# Patient Record
Sex: FEMALE | Race: WHITE | NOT HISPANIC OR LATINO
[De-identification: names, ages, dates, MRNs, and addresses within clinical notes are randomized per-mention and may not be internally consistent; named-entity substitution may affect disease eponyms.]

---

## 2017-10-25 PROBLEM — Z00.00 ENCOUNTER FOR PREVENTIVE HEALTH EXAMINATION: Status: ACTIVE | Noted: 2017-10-25

## 2017-11-16 ENCOUNTER — APPOINTMENT (OUTPATIENT)
Dept: CARDIOLOGY | Facility: CLINIC | Age: 82
End: 2017-11-16

## 2018-06-01 ENCOUNTER — RECORD ABSTRACTING (OUTPATIENT)
Age: 83
End: 2018-06-01

## 2018-07-18 DIAGNOSIS — Z78.9 OTHER SPECIFIED HEALTH STATUS: ICD-10-CM

## 2018-07-18 DIAGNOSIS — Z87.898 PERSONAL HISTORY OF OTHER SPECIFIED CONDITIONS: ICD-10-CM

## 2018-07-18 DIAGNOSIS — Z87.39 PERSONAL HISTORY OF OTHER DISEASES OF THE MUSCULOSKELETAL SYSTEM AND CONNECTIVE TISSUE: ICD-10-CM

## 2018-07-18 DIAGNOSIS — Z86.69 PERSONAL HISTORY OF OTHER DISEASES OF THE NERVOUS SYSTEM AND SENSE ORGANS: ICD-10-CM

## 2018-07-18 DIAGNOSIS — R60.0 LOCALIZED EDEMA: ICD-10-CM

## 2018-07-18 DIAGNOSIS — Z87.891 PERSONAL HISTORY OF NICOTINE DEPENDENCE: ICD-10-CM

## 2018-07-18 DIAGNOSIS — I47.1 SUPRAVENTRICULAR TACHYCARDIA: ICD-10-CM

## 2018-07-18 DIAGNOSIS — Z86.79 PERSONAL HISTORY OF OTHER DISEASES OF THE CIRCULATORY SYSTEM: ICD-10-CM

## 2018-08-01 ENCOUNTER — APPOINTMENT (OUTPATIENT)
Dept: CARDIOLOGY | Facility: CLINIC | Age: 83
End: 2018-08-01

## 2018-08-01 ENCOUNTER — RECORD ABSTRACTING (OUTPATIENT)
Age: 83
End: 2018-08-01

## 2018-08-11 PROCEDURE — 99285 EMERGENCY DEPT VISIT HI MDM: CPT

## 2018-08-11 PROCEDURE — 74177 CT ABD & PELVIS W/CONTRAST: CPT | Mod: 26

## 2018-08-12 ENCOUNTER — INPATIENT (INPATIENT)
Facility: HOSPITAL | Age: 83
LOS: 3 days | Discharge: HOME | End: 2018-08-16
Payer: MEDICARE

## 2018-08-13 PROCEDURE — 74019 RADEX ABDOMEN 2 VIEWS: CPT | Mod: 26

## 2018-11-08 ENCOUNTER — APPOINTMENT (OUTPATIENT)
Dept: CARDIOLOGY | Facility: CLINIC | Age: 83
End: 2018-11-08

## 2018-11-15 ENCOUNTER — APPOINTMENT (OUTPATIENT)
Dept: CARDIOLOGY | Facility: CLINIC | Age: 83
End: 2018-11-15
Payer: MEDICARE

## 2018-11-15 ENCOUNTER — NON-APPOINTMENT (OUTPATIENT)
Age: 83
End: 2018-11-15

## 2018-11-15 VITALS
BODY MASS INDEX: 23.82 KG/M2 | DIASTOLIC BLOOD PRESSURE: 68 MMHG | HEIGHT: 65 IN | WEIGHT: 143 LBS | OXYGEN SATURATION: 97 % | SYSTOLIC BLOOD PRESSURE: 116 MMHG | HEART RATE: 50 BPM

## 2018-11-15 DIAGNOSIS — Z86.69 PERSONAL HISTORY OF OTHER DISEASES OF THE NERVOUS SYSTEM AND SENSE ORGANS: ICD-10-CM

## 2018-11-15 DIAGNOSIS — Z87.19 PERSONAL HISTORY OF OTHER DISEASES OF THE DIGESTIVE SYSTEM: ICD-10-CM

## 2018-11-15 DIAGNOSIS — Z86.59 PERSONAL HISTORY OF OTHER MENTAL AND BEHAVIORAL DISORDERS: ICD-10-CM

## 2018-11-15 DIAGNOSIS — Z86.79 PERSONAL HISTORY OF OTHER DISEASES OF THE CIRCULATORY SYSTEM: ICD-10-CM

## 2018-11-15 DIAGNOSIS — Z86.39 PERSONAL HISTORY OF OTHER ENDOCRINE, NUTRITIONAL AND METABOLIC DISEASE: ICD-10-CM

## 2018-11-15 DIAGNOSIS — G89.4 CHRONIC PAIN SYNDROME: ICD-10-CM

## 2018-11-15 DIAGNOSIS — I10 ESSENTIAL (PRIMARY) HYPERTENSION: ICD-10-CM

## 2018-11-15 DIAGNOSIS — Z87.898 PERSONAL HISTORY OF OTHER SPECIFIED CONDITIONS: ICD-10-CM

## 2018-11-15 PROCEDURE — 93000 ELECTROCARDIOGRAM COMPLETE: CPT

## 2018-11-15 PROCEDURE — 99204 OFFICE O/P NEW MOD 45 MIN: CPT

## 2018-11-15 RX ORDER — ALPRAZOLAM 0.25 MG/1
0.25 TABLET ORAL TWICE DAILY
Refills: 0 | Status: ACTIVE | COMMUNITY

## 2018-11-15 RX ORDER — LEVOTHYROXINE SODIUM 88 UG/1
88 TABLET ORAL DAILY
Refills: 0 | Status: ACTIVE | COMMUNITY

## 2018-11-15 RX ORDER — ATENOLOL 25 MG/1
25 TABLET ORAL
Refills: 0 | Status: ACTIVE | COMMUNITY

## 2018-11-15 RX ORDER — ERGOCALCIFEROL 1.25 MG/1
1.25 MG CAPSULE, LIQUID FILLED ORAL
Refills: 0 | Status: ACTIVE | COMMUNITY

## 2018-11-15 RX ORDER — ESCITALOPRAM OXALATE 20 MG/1
20 TABLET ORAL DAILY
Refills: 0 | Status: ACTIVE | COMMUNITY

## 2018-11-15 RX ORDER — GABAPENTIN 100 MG/1
100 CAPSULE ORAL 3 TIMES DAILY
Refills: 0 | Status: ACTIVE | COMMUNITY

## 2018-11-15 RX ORDER — OXYCODONE 20 MG/1
20 TABLET ORAL TWICE DAILY
Refills: 0 | Status: ACTIVE | COMMUNITY

## 2018-11-15 RX ORDER — ZOLPIDEM TARTRATE 5 MG/1
5 TABLET, FILM COATED ORAL
Refills: 0 | Status: ACTIVE | COMMUNITY

## 2018-12-04 ENCOUNTER — OUTPATIENT (OUTPATIENT)
Dept: OUTPATIENT SERVICES | Facility: HOSPITAL | Age: 83
LOS: 1 days | End: 2018-12-04
Payer: MEDICARE

## 2018-12-04 PROCEDURE — 71046 X-RAY EXAM CHEST 2 VIEWS: CPT | Mod: 26

## 2018-12-06 ENCOUNTER — OUTPATIENT (OUTPATIENT)
Dept: OUTPATIENT SERVICES | Facility: HOSPITAL | Age: 83
LOS: 1 days | End: 2018-12-06

## 2018-12-06 ENCOUNTER — EMERGENCY (EMERGENCY)
Facility: HOSPITAL | Age: 83
LOS: 1 days | End: 2018-12-06
Payer: MEDICARE

## 2018-12-06 PROCEDURE — 74175 CTA ABDOMEN W/CONTRAST: CPT | Mod: 26

## 2018-12-06 PROCEDURE — 99285 EMERGENCY DEPT VISIT HI MDM: CPT

## 2018-12-06 PROCEDURE — 99223 1ST HOSP IP/OBS HIGH 75: CPT

## 2018-12-06 PROCEDURE — 71275 CT ANGIOGRAPHY CHEST: CPT | Mod: 26

## 2018-12-06 PROCEDURE — 71046 X-RAY EXAM CHEST 2 VIEWS: CPT | Mod: 26

## 2018-12-07 PROCEDURE — 93306 TTE W/DOPPLER COMPLETE: CPT | Mod: 26

## 2018-12-07 PROCEDURE — 99232 SBSQ HOSP IP/OBS MODERATE 35: CPT

## 2018-12-10 NOTE — HISTORY OF PRESENT ILLNESS
[FreeTextEntry1] : The patient is presenting here today for cardiac evaluation. Patient has a history of hypertension. Patient has a history of abdominal aortic aneurysm. She follows with vascular surgery.The patient has a history of sinus bradycardia. She reports no symptoms of any dizziness lightheadedness. No syncope or near syncope. No chest pains or shortness of breath. Patient is legally blind. She walks on a daily basis without any symptoms. She is physically very active.

## 2018-12-10 NOTE — ASSESSMENT
[FreeTextEntry1] : Hypertension well controlled.\par Today's ECG was done. Sinus bradycardia. No change compared to previous BCG. Patient is asymptomatic.\par Patient is physically very active. No chest pains or shortness of breath. She had a stress echocardiogram done a few years ago. No evidence of any ischemia.\par Abdominal aortic aneurysm. Patient follows with vascular surgery.\par Cardiac followup she'll be on an as needed.\par \par Addendum December 10, 2018. Patient is scheduled for endovascular repair of abdominal aneurysm. Based on examination from November 15, 2018 she is well compensated, in no significant risk for cardiovascular events during scheduled procedure.

## 2018-12-13 ENCOUNTER — INPATIENT (INPATIENT)
Facility: HOSPITAL | Age: 83
LOS: 0 days | Discharge: HOME | End: 2018-12-14

## 2019-04-19 ENCOUNTER — OUTPATIENT (OUTPATIENT)
Dept: OUTPATIENT SERVICES | Facility: HOSPITAL | Age: 84
LOS: 1 days | End: 2019-04-19

## 2019-11-24 ENCOUNTER — TRANSCRIPTION ENCOUNTER (OUTPATIENT)
Age: 84
End: 2019-11-24

## 2019-11-24 ENCOUNTER — EMERGENCY (EMERGENCY)
Facility: HOSPITAL | Age: 84
LOS: 1 days | End: 2019-11-24
Admitting: EMERGENCY MEDICINE
Payer: MEDICARE

## 2019-11-24 PROCEDURE — 71275 CT ANGIOGRAPHY CHEST: CPT | Mod: 26

## 2019-11-24 PROCEDURE — 71045 X-RAY EXAM CHEST 1 VIEW: CPT | Mod: 26

## 2019-11-24 PROCEDURE — 99285 EMERGENCY DEPT VISIT HI MDM: CPT | Mod: 25

## 2019-11-24 PROCEDURE — 71045 X-RAY EXAM CHEST 1 VIEW: CPT | Mod: 26,77

## 2019-11-24 PROCEDURE — 74175 CTA ABDOMEN W/CONTRAST: CPT | Mod: 26

## 2019-11-24 PROCEDURE — 36556 INSERT NON-TUNNEL CV CATH: CPT

## 2019-11-24 PROCEDURE — 74019 RADEX ABDOMEN 2 VIEWS: CPT | Mod: 26

## 2019-11-25 ENCOUNTER — INPATIENT (INPATIENT)
Facility: HOSPITAL | Age: 84
LOS: 3 days | Discharge: HOME | DRG: 445 | End: 2019-11-29
Attending: HOSPITALIST | Admitting: HOSPITALIST
Payer: MEDICARE

## 2019-11-25 VITALS
TEMPERATURE: 99 F | RESPIRATION RATE: 18 BRPM | OXYGEN SATURATION: 96 % | DIASTOLIC BLOOD PRESSURE: 81 MMHG | SYSTOLIC BLOOD PRESSURE: 125 MMHG | WEIGHT: 119.93 LBS | HEIGHT: 63 IN | HEART RATE: 87 BPM

## 2019-11-25 DIAGNOSIS — Z98.49 CATARACT EXTRACTION STATUS, UNSPECIFIED EYE: Chronic | ICD-10-CM

## 2019-11-25 DIAGNOSIS — K80.50 CALCULUS OF BILE DUCT WITHOUT CHOLANGITIS OR CHOLECYSTITIS WITHOUT OBSTRUCTION: ICD-10-CM

## 2019-11-25 DIAGNOSIS — Z90.49 ACQUIRED ABSENCE OF OTHER SPECIFIED PARTS OF DIGESTIVE TRACT: Chronic | ICD-10-CM

## 2019-11-25 DIAGNOSIS — Z93.3 COLOSTOMY STATUS: Chronic | ICD-10-CM

## 2019-11-25 DIAGNOSIS — H26.9 UNSPECIFIED CATARACT: Chronic | ICD-10-CM

## 2019-11-25 LAB
ABO RH CONFIRMATION: SIGNIFICANT CHANGE UP
ALBUMIN SERPL ELPH-MCNC: 3.2 G/DL — LOW (ref 3.3–5.2)
ALP SERPL-CCNC: 162 U/L — HIGH (ref 40–120)
ALT FLD-CCNC: 233 U/L — HIGH
ANION GAP SERPL CALC-SCNC: 14 MMOL/L — SIGNIFICANT CHANGE UP (ref 5–17)
ANION GAP SERPL CALC-SCNC: 15 MMOL/L — SIGNIFICANT CHANGE UP (ref 5–17)
APTT BLD: 29 SEC — SIGNIFICANT CHANGE UP (ref 27.5–36.3)
AST SERPL-CCNC: 527 U/L — HIGH
BASOPHILS # BLD AUTO: 0 K/UL — SIGNIFICANT CHANGE UP (ref 0–0.2)
BASOPHILS NFR BLD AUTO: 0 % — SIGNIFICANT CHANGE UP (ref 0–2)
BILIRUB SERPL-MCNC: 3.3 MG/DL — HIGH (ref 0.4–2)
BLD GP AB SCN SERPL QL: SIGNIFICANT CHANGE UP
BUN SERPL-MCNC: 12 MG/DL — SIGNIFICANT CHANGE UP (ref 8–20)
BUN SERPL-MCNC: 13 MG/DL — SIGNIFICANT CHANGE UP (ref 8–20)
BURR CELLS BLD QL SMEAR: PRESENT — SIGNIFICANT CHANGE UP
CALCIUM SERPL-MCNC: 8.5 MG/DL — LOW (ref 8.6–10.2)
CALCIUM SERPL-MCNC: 8.6 MG/DL — SIGNIFICANT CHANGE UP (ref 8.6–10.2)
CHLORIDE SERPL-SCNC: 104 MMOL/L — SIGNIFICANT CHANGE UP (ref 98–107)
CHLORIDE SERPL-SCNC: 104 MMOL/L — SIGNIFICANT CHANGE UP (ref 98–107)
CO2 SERPL-SCNC: 21 MMOL/L — LOW (ref 22–29)
CO2 SERPL-SCNC: 24 MMOL/L — SIGNIFICANT CHANGE UP (ref 22–29)
CREAT SERPL-MCNC: 0.45 MG/DL — LOW (ref 0.5–1.3)
CREAT SERPL-MCNC: 0.47 MG/DL — LOW (ref 0.5–1.3)
ELLIPTOCYTES BLD QL SMEAR: SIGNIFICANT CHANGE UP
EOSINOPHIL # BLD AUTO: 0 K/UL — SIGNIFICANT CHANGE UP (ref 0–0.5)
EOSINOPHIL NFR BLD AUTO: 0 % — SIGNIFICANT CHANGE UP (ref 0–6)
GIANT PLATELETS BLD QL SMEAR: PRESENT — SIGNIFICANT CHANGE UP
GLUCOSE SERPL-MCNC: 101 MG/DL — SIGNIFICANT CHANGE UP (ref 70–115)
GLUCOSE SERPL-MCNC: 127 MG/DL — HIGH (ref 70–115)
HCT VFR BLD CALC: 41 % — SIGNIFICANT CHANGE UP (ref 34.5–45)
HGB BLD-MCNC: 12.5 G/DL — SIGNIFICANT CHANGE UP (ref 11.5–15.5)
INR BLD: 1.11 RATIO — SIGNIFICANT CHANGE UP (ref 0.88–1.16)
LACTATE BLDV-MCNC: 1.8 MMOL/L — SIGNIFICANT CHANGE UP (ref 0.5–2)
LYMPHOCYTES # BLD AUTO: 0.42 K/UL — LOW (ref 1–3.3)
LYMPHOCYTES # BLD AUTO: 6.2 % — LOW (ref 13–44)
MANUAL SMEAR VERIFICATION: SIGNIFICANT CHANGE UP
MCHC RBC-ENTMCNC: 28.1 PG — SIGNIFICANT CHANGE UP (ref 27–34)
MCHC RBC-ENTMCNC: 30.5 GM/DL — LOW (ref 32–36)
MCV RBC AUTO: 92.1 FL — SIGNIFICANT CHANGE UP (ref 80–100)
METAMYELOCYTES # FLD: 1.8 % — HIGH (ref 0–0)
MONOCYTES # BLD AUTO: 0.06 K/UL — SIGNIFICANT CHANGE UP (ref 0–0.9)
MONOCYTES NFR BLD AUTO: 0.9 % — LOW (ref 2–14)
NEUTROPHILS # BLD AUTO: 6.21 K/UL — SIGNIFICANT CHANGE UP (ref 1.8–7.4)
NEUTROPHILS NFR BLD AUTO: 53.1 % — SIGNIFICANT CHANGE UP (ref 43–77)
NEUTS BAND # BLD: 38 % — HIGH (ref 0–8)
PLAT MORPH BLD: NORMAL — SIGNIFICANT CHANGE UP
PLATELET # BLD AUTO: 88 K/UL — LOW (ref 150–400)
POIKILOCYTOSIS BLD QL AUTO: SIGNIFICANT CHANGE UP
POTASSIUM SERPL-MCNC: 2.8 MMOL/L — CRITICAL LOW (ref 3.5–5.3)
POTASSIUM SERPL-MCNC: 4.2 MMOL/L — SIGNIFICANT CHANGE UP (ref 3.5–5.3)
POTASSIUM SERPL-SCNC: 2.8 MMOL/L — CRITICAL LOW (ref 3.5–5.3)
POTASSIUM SERPL-SCNC: 4.2 MMOL/L — SIGNIFICANT CHANGE UP (ref 3.5–5.3)
PROCALCITONIN SERPL-MCNC: 28.94 NG/ML — HIGH (ref 0.02–0.1)
PROT SERPL-MCNC: 5.6 G/DL — LOW (ref 6.6–8.7)
PROTHROM AB SERPL-ACNC: 12.8 SEC — SIGNIFICANT CHANGE UP (ref 10–12.9)
RBC # BLD: 4.45 M/UL — SIGNIFICANT CHANGE UP (ref 3.8–5.2)
RBC # FLD: 14.3 % — SIGNIFICANT CHANGE UP (ref 10.3–14.5)
RBC BLD AUTO: ABNORMAL
SODIUM SERPL-SCNC: 139 MMOL/L — SIGNIFICANT CHANGE UP (ref 135–145)
SODIUM SERPL-SCNC: 143 MMOL/L — SIGNIFICANT CHANGE UP (ref 135–145)
WBC # BLD: 6.82 K/UL — SIGNIFICANT CHANGE UP (ref 3.8–10.5)
WBC # FLD AUTO: 6.82 K/UL — SIGNIFICANT CHANGE UP (ref 3.8–10.5)

## 2019-11-25 PROCEDURE — 43274 ERCP DUCT STENT PLACEMENT: CPT

## 2019-11-25 PROCEDURE — 99223 1ST HOSP IP/OBS HIGH 75: CPT | Mod: 25

## 2019-11-25 PROCEDURE — 93010 ELECTROCARDIOGRAM REPORT: CPT

## 2019-11-25 PROCEDURE — 12345: CPT | Mod: NC

## 2019-11-25 PROCEDURE — 99285 EMERGENCY DEPT VISIT HI MDM: CPT

## 2019-11-25 PROCEDURE — 99223 1ST HOSP IP/OBS HIGH 75: CPT

## 2019-11-25 PROCEDURE — 43264 ERCP REMOVE DUCT CALCULI: CPT | Mod: 22

## 2019-11-25 RX ORDER — ALPRAZOLAM 0.25 MG
0.25 TABLET ORAL DAILY
Refills: 0 | Status: DISCONTINUED | OUTPATIENT
Start: 2019-11-25 | End: 2019-11-29

## 2019-11-25 RX ORDER — MORPHINE SULFATE 50 MG/1
2 CAPSULE, EXTENDED RELEASE ORAL EVERY 6 HOURS
Refills: 0 | Status: DISCONTINUED | OUTPATIENT
Start: 2019-11-25 | End: 2019-11-25

## 2019-11-25 RX ORDER — ESCITALOPRAM OXALATE 10 MG/1
20 TABLET, FILM COATED ORAL DAILY
Refills: 0 | Status: DISCONTINUED | OUTPATIENT
Start: 2019-11-25 | End: 2019-11-29

## 2019-11-25 RX ORDER — LEVOTHYROXINE SODIUM 125 MCG
88 TABLET ORAL DAILY
Refills: 0 | Status: DISCONTINUED | OUTPATIENT
Start: 2019-11-25 | End: 2019-11-29

## 2019-11-25 RX ORDER — ATENOLOL 25 MG/1
25 TABLET ORAL
Refills: 0 | Status: DISCONTINUED | OUTPATIENT
Start: 2019-11-25 | End: 2019-11-29

## 2019-11-25 RX ORDER — SODIUM CHLORIDE 9 MG/ML
1000 INJECTION INTRAMUSCULAR; INTRAVENOUS; SUBCUTANEOUS
Refills: 0 | Status: DISCONTINUED | OUTPATIENT
Start: 2019-11-25 | End: 2019-11-29

## 2019-11-25 RX ORDER — PIPERACILLIN AND TAZOBACTAM 4; .5 G/20ML; G/20ML
3.38 INJECTION, POWDER, LYOPHILIZED, FOR SOLUTION INTRAVENOUS ONCE
Refills: 0 | Status: COMPLETED | OUTPATIENT
Start: 2019-11-25 | End: 2019-11-25

## 2019-11-25 RX ORDER — ACETAMINOPHEN 500 MG
650 TABLET ORAL EVERY 6 HOURS
Refills: 0 | Status: DISCONTINUED | OUTPATIENT
Start: 2019-11-25 | End: 2019-11-29

## 2019-11-25 RX ORDER — POTASSIUM CHLORIDE 20 MEQ
10 PACKET (EA) ORAL
Refills: 0 | Status: COMPLETED | OUTPATIENT
Start: 2019-11-25 | End: 2019-11-25

## 2019-11-25 RX ORDER — POTASSIUM CHLORIDE 20 MEQ
40 PACKET (EA) ORAL ONCE
Refills: 0 | Status: COMPLETED | OUTPATIENT
Start: 2019-11-25 | End: 2019-11-25

## 2019-11-25 RX ORDER — MORPHINE SULFATE 50 MG/1
4 CAPSULE, EXTENDED RELEASE ORAL EVERY 6 HOURS
Refills: 0 | Status: DISCONTINUED | OUTPATIENT
Start: 2019-11-25 | End: 2019-11-29

## 2019-11-25 RX ORDER — MORPHINE SULFATE 50 MG/1
2 CAPSULE, EXTENDED RELEASE ORAL EVERY 6 HOURS
Refills: 0 | Status: DISCONTINUED | OUTPATIENT
Start: 2019-11-25 | End: 2019-11-29

## 2019-11-25 RX ORDER — ZOLPIDEM TARTRATE 10 MG/1
5 TABLET ORAL AT BEDTIME
Refills: 0 | Status: DISCONTINUED | OUTPATIENT
Start: 2019-11-25 | End: 2019-11-29

## 2019-11-25 RX ORDER — OXYCODONE HYDROCHLORIDE 5 MG/1
20 TABLET ORAL EVERY 12 HOURS
Refills: 0 | Status: DISCONTINUED | OUTPATIENT
Start: 2019-11-25 | End: 2019-11-29

## 2019-11-25 RX ORDER — PIPERACILLIN AND TAZOBACTAM 4; .5 G/20ML; G/20ML
3.38 INJECTION, POWDER, LYOPHILIZED, FOR SOLUTION INTRAVENOUS EVERY 8 HOURS
Refills: 0 | Status: DISCONTINUED | OUTPATIENT
Start: 2019-11-25 | End: 2019-11-27

## 2019-11-25 RX ADMIN — MORPHINE SULFATE 4 MILLIGRAM(S): 50 CAPSULE, EXTENDED RELEASE ORAL at 10:29

## 2019-11-25 RX ADMIN — Medication 100 MILLIEQUIVALENT(S): at 07:03

## 2019-11-25 RX ADMIN — OXYCODONE HYDROCHLORIDE 20 MILLIGRAM(S): 5 TABLET ORAL at 05:42

## 2019-11-25 RX ADMIN — Medication 100 MILLIEQUIVALENT(S): at 05:52

## 2019-11-25 RX ADMIN — Medication 88 MICROGRAM(S): at 05:51

## 2019-11-25 RX ADMIN — Medication 100 MILLIEQUIVALENT(S): at 05:25

## 2019-11-25 RX ADMIN — Medication 40 MILLIEQUIVALENT(S): at 05:26

## 2019-11-25 RX ADMIN — PIPERACILLIN AND TAZOBACTAM 25 GRAM(S): 4; .5 INJECTION, POWDER, LYOPHILIZED, FOR SOLUTION INTRAVENOUS at 18:34

## 2019-11-25 RX ADMIN — OXYCODONE HYDROCHLORIDE 20 MILLIGRAM(S): 5 TABLET ORAL at 18:34

## 2019-11-25 RX ADMIN — OXYCODONE HYDROCHLORIDE 20 MILLIGRAM(S): 5 TABLET ORAL at 08:35

## 2019-11-25 RX ADMIN — PIPERACILLIN AND TAZOBACTAM 200 GRAM(S): 4; .5 INJECTION, POWDER, LYOPHILIZED, FOR SOLUTION INTRAVENOUS at 07:02

## 2019-11-25 RX ADMIN — Medication 40 MILLIEQUIVALENT(S): at 09:43

## 2019-11-25 RX ADMIN — SODIUM CHLORIDE 75 MILLILITER(S): 9 INJECTION INTRAMUSCULAR; INTRAVENOUS; SUBCUTANEOUS at 07:04

## 2019-11-25 RX ADMIN — MORPHINE SULFATE 4 MILLIGRAM(S): 50 CAPSULE, EXTENDED RELEASE ORAL at 09:42

## 2019-11-25 RX ADMIN — Medication 0.25 MILLIGRAM(S): at 07:23

## 2019-11-25 RX ADMIN — MORPHINE SULFATE 2 MILLIGRAM(S): 50 CAPSULE, EXTENDED RELEASE ORAL at 04:49

## 2019-11-25 RX ADMIN — MORPHINE SULFATE 2 MILLIGRAM(S): 50 CAPSULE, EXTENDED RELEASE ORAL at 08:35

## 2019-11-25 NOTE — H&P ADULT - ASSESSMENT
84yoF hx HTN, AAA, depression/anxiety,  perforated viscus s/p sigmoid colectomy with ostomy (2014), legally blind from macular degeneration, chronic pain from diffuse OA and spinal stenosis, sent from [facility] ([facility] [place]) for common bile duct stones and need for ERCP    Choledocholithiasis   -Admit to medicine  -Received 2L at [facility], will start gentle maintenance IVF  -NPO except meds  -Checked procalcitonin, very elevated at 28, will order blood cultures and start Zosyn for possible superimposed hepatobiliary infection  -Pain control with morphine  -GI consulted    HTN  -Atenolol resumed, dosing every other day    Hypokalemia  -K+ now 2.8 on admission, ordered IV and PO repletion  -Monitored bed    AAA  -No acute dissection on CTA, showed stable 5cm ascending aorta aneurysm and 5.4cm infrarenal abdominal aneurysm   -Outpatient surveillance    Chronic pain syndrome  -From OA of neck, back and knees and spinal stenosis  -OxyContin 20mg ER resumed    Depression/anxiety  -Escitalopram and Xanax PRN resumed    Hypothyroidism  -Levothyroxine resumed    Prophylactic measure  -Lovenox 84yoF hx HTN, AAA, depression/anxiety,  perforated viscus s/p sigmoid colectomy with ostomy (2014), legally blind from macular degeneration, chronic pain from diffuse OA and spinal stenosis, sent from [facility] ([facility] [place]) for common bile duct stones and need for ERCP    Choledocholithiasis   -Admit to medicine  -Received 2L at [facility], will start gentle maintenance IVF  -NPO except meds  -Checked procalcitonin, very elevated at 28, will order blood cultures and start Zosyn for possible superimposed hepatobiliary infection  -Pain control with morphine  -GI consulted    Hypokalemia  -K+ now 2.8 on admission, ordered IV and PO repletion  -Monitored bed    Elevated LFTs  -Due to CBD stones, monitor     HTN  -Atenolol resumed, dosing every other day    AAA  -No acute dissection on CTA, showed stable 5cm ascending aorta aneurysm and 5.4cm infrarenal abdominal aneurysm   -Outpatient surveillance    Chronic pain syndrome  -From OA of neck, back and knees and spinal stenosis  -OxyContin 20mg ER resumed    Depression/anxiety  -Escitalopram and Xanax PRN resumed    Hypothyroidism  -Levothyroxine resumed    Prophylactic measure  -Lovenox

## 2019-11-25 NOTE — BRIEF OPERATIVE NOTE - OPERATION/FINDINGS
ampulla seen adjacent to the duodenal diverticulum  common bile duct selectively cannulated  cholangiogram - CBD dilated to 1.5 cm; multiple filling defects noted including two large filling defects  several balloon sweeps performed with 12 - 15 mm balloon  Sludge and two large stones removed  wire was lost so the CBD was re-cannulated   balloon cholangiogram with large filling defect in the common hepatic   several balloon sweeps performed with removal of copious amounts of sludge and 1.2 cm yellow pigment stone  ampulla appeared edematous   10 mm x 40 mm full covered metal stent inserted over guidewire ampulla seen adjacent to the duodenal diverticulum  common bile duct selectively cannulated  cholangiogram - CBD dilated to 1.5 cm; multiple filling defects noted including two large filling defects  s/p sphincterotomy  several balloon sweeps performed with 12 - 15 mm balloon  Sludge and two large stones removed  wire was lost so the CBD was re-cannulated   balloon cholangiogram with large filling defect in the common hepatic   several balloon sweeps performed with removal of copious amounts of sludge and 1.2 cm yellow pigment stone  ampulla appeared edematous   10 mm x 40 mm full covered metal stent inserted over guidewire

## 2019-11-25 NOTE — H&P ADULT - NSHPLABSRESULTS_GEN_ALL_CORE
12.5   6.82  )-----------( 88       ( 25 Nov 2019 04:31 )             41.0       11-25    143  |  104  |  12.0  ----------------------------<  127<H>  2.8<LL>   |  24.0  |  0.47<L>    Ca    8.6      25 Nov 2019 04:31    TPro  5.6<L>  /  Alb  3.2<L>  /  TBili  3.3<H>  /  DBili  x   /  AST  527<H>  /  ALT  233<H>  /  AlkPhos  162<H>  11-25

## 2019-11-25 NOTE — ED ADULT NURSE NOTE - NSIMPLEMENTINTERV_GEN_ALL_ED
Implemented All Fall with Harm Risk Interventions:  [place] to call system. Call bell, personal items and telephone within reach. Instruct patient to call for assistance. Room bathroom lighting operational. Non-slip footwear when patient is off stretcher. Physically safe environment: no spills, clutter or unnecessary equipment. Stretcher in lowest position, wheels locked, appropriate side rails in place. Provide visual cue, wrist band, yellow gown, etc. Monitor gait and stability. Monitor for mental status changes and reorient to person, place, and time. Review medications for side effects contributing to fall risk. Reinforce activity limits and safety measures with patient and family. Provide visual clues: red socks.

## 2019-11-25 NOTE — BRIEF OPERATIVE NOTE - NSICDXBRIEFPROCEDURE_GEN_ALL_CORE_FT
PROCEDURES:  ERCP, with metal stent insertion 25-Nov-2019 14:47:27  Michael Palafox  ERCP, with removal of stone 25-Nov-2019 14:47:17  Michael Palafox

## 2019-11-25 NOTE — H&P ADULT - NSICDXPASTSURGICALHX_GEN_ALL_CORE_FT
PAST SURGICAL HISTORY:  H/O colectomy sigmoid colectomy    S/P appendectomy 2013    S/P cataract surgery right eye    S/P cholecystectomy     S/P colostomy 2014

## 2019-11-25 NOTE — ED PROVIDER NOTE - OBJECTIVE STATEMENT
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 Edmond. Pt reports she went to [place] for severe diffuse abd pain that began 25 hours ago, received CTA, increased liver functions, found with severe CBD and no evidence of aortic dissection. Reports nausea, vomiting, diaphoresis, and dry heaves 2 hours PTA. Pt is a current smoker. Denies any known allergies and drinking EtOH. Pt lives alone at home with an aid. No fever, CP, back pain. 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 Edmond. Pt reports she went to [place] for severe diffuse abd pain that began 25 hours ago, received CTA, increased liver functions, found with severe CBD and no evidence of aortic dissection. Reports nausea, vomiting, diaphoresis, and dry heaves 2 hours PTA. Pt is a current smoker. Denies any known allergies and drinking EtOH. Pt lives alone at home with an aid. No fever, CP, back pain. NKA as per transfer documentation.

## 2019-11-25 NOTE — CONSULT NOTE ADULT - SUBJECTIVE AND OBJECTIVE BOX
HISTORY OF PRESENT ILLNESS:  This is a 84y old Female with a past medical history significant for     REVIEW OF SYSTEMS:  Constitutional:  No unintentional weight loss, fevers, chills or night sweats	  Eyes: No eye pain, redness, discharge, or proptosis  ENMT: No sore throat, ear pain, mouth sores, or swollen glands in the neck  Respiratory: No dyspnea, cough or wheezing  Cardiovascular: No chest pain, dyspnea on exertion, or orthopnea  Gastrointestinal:	Please see HPI  Genitourinary: No dysuria or hematuria  Neurological:	 No changes in sleep/wake cycle, convulsions, confusion, dizziness or lightheadedness  Psychiatric: No changes in personality or emotional problems   Hematology: No easy bruising   Endocrine: No hot or cold flashes or deepening of voice	  All other review of systems were completed and were otherwise negative save what is reported in the HPI.    PAST MEDICAL/SURGICAL HISTORY:  Spinal stenosis  Osteoarthritis  Chronic back pain  Cataract  Macular degeneration  Colostomy present  Perforated abdominal viscus: spontaneous rupture as per patient  Hypothyroidism  Depression with anxiety  AAA (abdominal aortic aneurysm): ascending aortic and infrarenal aneurysms  Hypertension  S/P appendectomy: 2013  S/P cataract surgery: right eye  S/P cholecystectomy  S/P colostomy: 2014  H/O colectomy: sigmoid colectomy    SOCIAL HISTORY:  - TOBACCO:  - ALCOHOL:  - ILLICIT DRUG USE: Denies    FAMILY HISTORY:  No known history of gastrointestinal or liver disease;  No pertinent family history in first degree relatives      HOME MEDICATIONS:  Ambien 5 mg oral tablet: 1 tab(s) orally once a day (at bedtime), As Needed (25 Nov 2019 06:16)  atenolol 25 mg oral tablet: 1 tab(s) orally every other day (25 Nov 2019 06:16)  Lexapro 20 mg oral tablet: 1 tab(s) orally once a day (25 Nov 2019 06:16)  OxyCONTIN 20 mg oral tablet, extended release: 1 tab(s) orally every 12 hours (25 Nov 2019 06:16)  Synthroid 88 mcg (0.088 mg) oral tablet: 1 tab(s) orally once a day (25 Nov 2019 06:16)  Xanax 0.25 mg oral tablet: 1 tab(s) orally once a day, As Needed for Anxiety (25 Nov 2019 06:16)    INPATIENT MEDICATIONS:  MEDICATIONS  (STANDING):  ATENolol  Tablet 25 milliGRAM(s) Oral <User Schedule>  escitalopram 20 milliGRAM(s) Oral daily  levothyroxine 88 MICROGram(s) Oral daily  oxyCODONE  ER Tablet 20 milliGRAM(s) Oral every 12 hours  piperacillin/tazobactam IVPB.. 3.375 Gram(s) IV Intermittent every 8 hours  potassium chloride    Tablet ER 40 milliEquivalent(s) Oral once  sodium chloride 0.9%. 1000 milliLiter(s) (75 mL/Hr) IV Continuous <Continuous>    MEDICATIONS  (PRN):  acetaminophen   Tablet .. 650 milliGRAM(s) Oral every 6 hours PRN Mild Pain (1 - 3)  ALPRAZolam 0.25 milliGRAM(s) Oral daily PRN Anxiety  morphine  - Injectable 2 milliGRAM(s) IV Push every 6 hours PRN Moderate Pain (4 - 6)  morphine  - Injectable 4 milliGRAM(s) IV Push every 6 hours PRN Severe Pain (7 - 10)  zolpidem 5 milliGRAM(s) Oral at bedtime PRN Insomnia    ALLERGIES:  No Known Allergies    VITAL SIGNS LAST 24 HOURS:  T(C): 36.1 (25 Nov 2019 05:58), Max: 37.2 (25 Nov 2019 01:54)  T(F): 96.9 (25 Nov 2019 05:58), Max: 98.9 (25 Nov 2019 01:54)  HR: 88 (25 Nov 2019 05:58) (87 - 88)  BP: 100/68 (25 Nov 2019 05:58) (100/68 - 125/81)  BP(mean): --  RR: 16 (25 Nov 2019 05:58) (16 - 18)  SpO2: 93% (25 Nov 2019 05:58) (93% - 96%)            PHYSICAL EXAM:  Constitutional: Well-developed, well-nourished, in no apparent distress  Eyes: Sclerae anicteric, conjunctivae normal  ENMT: Mucus membranes moist, no oropharyngeal thrush noted  Neck: No thyroid nodules appreciated, no significant cervical or supraclavicular lymphadenopathy  Respiratory: Breathing nonlabored; clear to auscultation  Cardiovascular: Regular rate and rhythm  Gastrointestinal: Soft, nontender, nondistended, normoactive bowel sounds; no hepatosplenomegaly appreciated; no rebound tenderness or involuntary guarding  Extremities: No clubbing, cyanosis or edema  Neurological: Alert and oriented to person, place and time; no asterixis  Skin: No jaundice  Lymph Nodes: No significant lymphadenopathy  Musculoskeletal: No significant peripheral atrophy  Psychiatric: Affect and mood appropriate      LABS:                        12.5   6.82  )-----------( 88       ( 25 Nov 2019 04:31 )             41.0     PT/INR - ( 25 Nov 2019 04:31 )   PT: 12.8 sec;   INR: 1.11 ratio         PTT - ( 25 Nov 2019 04:31 )  PTT:29.0 sec  11-25    143  |  104  |  12.0  ----------------------------<  127<H>  2.8<LL>   |  24.0  |  0.47<L>    Ca    8.6      25 Nov 2019 04:31    TPro  5.6<L>  /  Alb  3.2<L>  /  TBili  3.3<H>  /  DBili  x   /  AST  527<H>  /  ALT  233<H>  /  AlkPhos  162<H>  11-25    LIVER FUNCTIONS - ( 25 Nov 2019 04:31 )  Alb: 3.2 g/dL / Pro: 5.6 g/dL / ALK PHOS: 162 U/L / ALT: 233 U/L / AST: 527 U/L / GGT: x                 IMAGING:  I personally reviewed the XXXX, and I agree with the radiologist's interpretation. HISTORY OF PRESENT ILLNESS:  This is a 84y old Female with a past medical history significant for HTN, AAA, depression/anxiety, perforated viscus s/p sigmoid colectomy with ostomy (2014), s/p cholecystectomy, legally blind from macular degeneration, chronic pain syndrome from diffuse OA and spinal stenosis, [place] [facility] [place].  She states she suddenly developed RUQ/epigastric pain associated with nausea, vomiting. She states the pain was a 10/10 and has been constant. The abdominal pain is 8/10 currently. She denies any previous episodes of pain. She had a cholecystectomy >30 years  ago and has not had any gallstone problems since.  She states the pain was constant. She had a CT angio chest/abd/pelvis which showed multiple stones in CBD.  She was noted to have hypokalemia and elevated LFTs.  She had a markedly elevated procalcitonin. She was seen by Surgery and has been started on Zosyn.     REVIEW OF SYSTEMS:  Constitutional:  No unintentional weight loss, fevers, chills or night sweats	  Eyes: No eye pain, redness, discharge, or proptosis  ENMT: No sore throat, ear pain, mouth sores, or swollen glands in the neck  Respiratory: No dyspnea, cough or wheezing  Cardiovascular: No chest pain, dyspnea on exertion, or orthopnea  Gastrointestinal:	Please see HPI  Genitourinary: No dysuria or hematuria  Neurological:	 No changes in sleep/wake cycle, convulsions, confusion, dizziness or lightheadedness  Psychiatric: No changes in personality or emotional problems   Hematology: No easy bruising   Endocrine: No hot or cold flashes or deepening of voice	  All other review of systems were completed and were otherwise negative save what is reported in the HPI.    PAST MEDICAL/SURGICAL HISTORY:  Spinal stenosis  Osteoarthritis  Chronic back pain  Cataract  Macular degeneration  Colostomy present  Perforated abdominal viscus: spontaneous rupture as per patient  Hypothyroidism  Depression with anxiety  AAA (abdominal aortic aneurysm): ascending aortic and infrarenal aneurysms  Hypertension  S/P appendectomy: 2013  S/P cataract surgery: right eye  S/P cholecystectomy  S/P colostomy: 2014  H/O colectomy: sigmoid colectomy    SOCIAL HISTORY:  - TOBACCO: active smoker  - ALCOHOL: quit in the early 1980s  - ILLICIT DRUG USE: Denies    FAMILY HISTORY:  No known history of gastrointestinal or liver disease;  No pertinent family history in first degree relatives      HOME MEDICATIONS:  Ambien 5 mg oral tablet: 1 tab(s) orally once a day (at bedtime), As Needed (25 Nov 2019 06:16)  atenolol 25 mg oral tablet: 1 tab(s) orally every other day (25 Nov 2019 06:16)  Lexapro 20 mg oral tablet: 1 tab(s) orally once a day (25 Nov 2019 06:16)  OxyCONTIN 20 mg oral tablet, extended release: 1 tab(s) orally every 12 hours (25 Nov 2019 06:16)  Synthroid 88 mcg (0.088 mg) oral tablet: 1 tab(s) orally once a day (25 Nov 2019 06:16)  Xanax 0.25 mg oral tablet: 1 tab(s) orally once a day, As Needed for Anxiety (25 Nov 2019 06:16)    INPATIENT MEDICATIONS:  MEDICATIONS  (STANDING):  ATENolol  Tablet 25 milliGRAM(s) Oral <User Schedule>  escitalopram 20 milliGRAM(s) Oral daily  levothyroxine 88 MICROGram(s) Oral daily  oxyCODONE  ER Tablet 20 milliGRAM(s) Oral every 12 hours  piperacillin/tazobactam IVPB.. 3.375 Gram(s) IV Intermittent every 8 hours  potassium chloride    Tablet ER 40 milliEquivalent(s) Oral once  sodium chloride 0.9%. 1000 milliLiter(s) (75 mL/Hr) IV Continuous <Continuous>    MEDICATIONS  (PRN):  acetaminophen   Tablet .. 650 milliGRAM(s) Oral every 6 hours PRN Mild Pain (1 - 3)  ALPRAZolam 0.25 milliGRAM(s) Oral daily PRN Anxiety  morphine  - Injectable 2 milliGRAM(s) IV Push every 6 hours PRN Moderate Pain (4 - 6)  morphine  - Injectable 4 milliGRAM(s) IV Push every 6 hours PRN Severe Pain (7 - 10)  zolpidem 5 milliGRAM(s) Oral at bedtime PRN Insomnia    ALLERGIES:  No Known Allergies    VITAL SIGNS LAST 24 HOURS:  T(C): 36.1 (25 Nov 2019 05:58), Max: 37.2 (25 Nov 2019 01:54)  T(F): 96.9 (25 Nov 2019 05:58), Max: 98.9 (25 Nov 2019 01:54)  HR: 88 (25 Nov 2019 05:58) (87 - 88)  BP: 100/68 (25 Nov 2019 05:58) (100/68 - 125/81)  BP(mean): --  RR: 16 (25 Nov 2019 05:58) (16 - 18)  SpO2: 93% (25 Nov 2019 05:58) (93% - 96%)      PHYSICAL EXAM:  Constitutional: Well-developed, well-nourished, in no apparent distress  Eyes: Sclerae anicteric, conjunctivae normal  ENMT: Mucus membranes moist, no oropharyngeal thrush noted  Neck: No thyroid nodules appreciated, no significant cervical or supraclavicular lymphadenopathy  Respiratory: Breathing nonlabored; clear to auscultation  Cardiovascular: Regular rate and rhythm  Gastrointestinal: Soft, RUQ tenderness, nondistended, normoactive bowel sounds  Extremities: No clubbing, cyanosis or edema  Neurological: Alert and oriented to person, place and time; no asterixis  Skin: No jaundice  Lymph Nodes: No significant lymphadenopathy  Musculoskeletal: No significant peripheral atrophy  Psychiatric: Affect and mood appropriate      LABS:                        12.5   6.82  )-----------( 88       ( 25 Nov 2019 04:31 )             41.0     PT/INR - ( 25 Nov 2019 04:31 )   PT: 12.8 sec;   INR: 1.11 ratio         PTT - ( 25 Nov 2019 04:31 )  PTT:29.0 sec  11-25    143  |  104  |  12.0  ----------------------------<  127<H>  2.8<LL>   |  24.0  |  0.47<L>    Ca    8.6      25 Nov 2019 04:31    TPro  5.6<L>  /  Alb  3.2<L>  /  TBili  3.3<H>  /  DBili  x   /  AST  527<H>  /  ALT  233<H>  /  AlkPhos  162<H>  11-25    LIVER FUNCTIONS - ( 25 Nov 2019 04:31 )  Alb: 3.2 g/dL / Pro: 5.6 g/dL / ALK PHOS: 162 U/L / ALT: 233 U/L / AST: 527 U/L / GGT: x             IMAGING:  CT angio at [facility]

## 2019-11-25 NOTE — ED ADULT NURSE REASSESSMENT NOTE - NS ED NURSE REASSESS COMMENT FT1
Assumed care from previous RN, pt A&O x's 4, resp even and unlabored, color good, pt c/o abd pain 8/10, pt to be medicated as per MD orders, pt tolerating fluid well via patent IJ in right neck placed prior to arrival to [place], offers no other complaints at this time, awaiting admit bed, pt aware of plan of care and verbalizes understanding, will continue to monitor for safety and comfort Assumed care from previous RN, pt A&O x's 4, resp even and unlabored, color good, pt c/o abd pain 8/10, pt to be medicated as per MD orders, pt with colostomy in place in L mid quadrant, stoma pink, pt tolerating fluid well via patent IJ in right neck placed prior to arrival to [place], offers no other complaints at this time, awaiting admit bed, pt aware of plan of care and verbalizes understanding, will continue to monitor for safety and comfort

## 2019-11-25 NOTE — CHART NOTE - NSCHARTNOTEFT_GEN_A_CORE
Pt seen/ examined at bedside and charts reviewed.  Admitted early morning as transfer from [facility] for CBD stone and possibility of cholangitis.   Pt awake/ alert, she reports abdomen pain and generalized body pain.   Exam- Vital stable, cachectic old age lady, lungs CTAb, Central line in neck, heart sounds regular, abd soft, tenderness+, BS +, +ostomy.  CBD stone- GI consult noted- ERCP today, c/w empiric Abx, follow up blood cx.   Please refer to early morning H&P for full details!!

## 2019-11-25 NOTE — H&P ADULT - NSHPPHYSICALEXAM_GEN_ALL_CORE
Vital Signs Last 24 Hrs  T(C): 36.1 (25 Nov 2019 05:58), Max: 37.2 (25 Nov 2019 01:54)  T(F): 96.9 (25 Nov 2019 05:58), Max: 98.9 (25 Nov 2019 01:54)  HR: 88 (25 Nov 2019 05:58) (87 - 88)  BP: 100/68 (25 Nov 2019 05:58) (100/68 - 125/81)  BP(mean): --  RR: 16 (25 Nov 2019 05:58) (16 - 18)  SpO2: 93% (25 Nov 2019 05:58) (93% - 96%)    GENERAL:  Well-appearing, not in acute distress  EYES:  Clear conjuctiva, extraocular movement intact  ENT: Moist mucous membranes  RESP:  Non-labored breathing pattern, lungs clear to ausculation in anterior fields  CV: Regular rate and rhythm, no murmurs appreciated, no lower extremity edema  GI: Soft, non-tender, non-distended  NEURO: Awake, alert, conversant, upper and lower extremity strength 5/5, light touch sensation grossly intact  PSYCH: Calm, cooperative  SKIN: No rash or lesions, warm and dry

## 2019-11-25 NOTE — H&P ADULT - NSICDXPASTMEDICALHX_GEN_ALL_CORE_FT
PAST MEDICAL HISTORY:  AAA (abdominal aortic aneurysm) ascending aortic and infrarenal aneurysms    Cataract     Chronic back pain     Colostomy present     Depression with anxiety     Hypertension     Hypothyroidism     Macular degeneration     Osteoarthritis     Perforated abdominal viscus spontaneous rupture as per patient    Spinal stenosis

## 2019-11-25 NOTE — H&P ADULT - HISTORY OF PRESENT ILLNESS
84yoF hx HTN, AAA, depression/anxiety, perforated viscus s/p sigmoid colectomy with ostomy (2014), s/p cholecystectomy, legally blind from macular degeneration, chronic pain from diffuse OA and spinal stenosis, sent [facility] for common bile duct stones and need for ERCP. [facility] with a 1 day history of abdominal pain located in her upper quadrants associated with nausea, vomiting with non-bloody, non-bilious emesis and decreased ostomy output.  She then developed chest pain radiating to back at OSH, had CT angio chest/abd/pelvis which showed multiple stones in CBD including a 1cm stone distally with intrahepatic ductal dilatation. CTA was negative for dissection and stable aneurysmal dilation of ascending and infrarenal aorta.  [facility], VSS, labs notable for K+ 3.4, lactate of 2.5 and 2.8 and elevated LFTs.  She received 2L of NS, dilaudid, Zofran.  Surgery team was called there and recommend transfer to [facility] for GI eval and ERCP.   Of note, a right internal jugular TLC was placed at OSH due to inability to establish peripheral vascular access.  Pt still reporting persistent abdominal pain and endorses subjective fevers, chills and dry heaving. 84yoF hx HTN, AAA, depression/anxiety, perforated viscus s/p sigmoid colectomy with ostomy (2014), s/p cholecystectomy, legally blind from macular degeneration, chronic pain from diffuse OA and spinal stenosis, sent from [facility] ([facility] [place]) for common bile duct stones and need for ERCP. Pt presented to [facility] with a 1 day history of abdominal pain located in her upper quadrants associated with nausea, vomiting with non-bloody, non-bilious emesis and decreased ostomy output.  She then developed chest pain radiating to back at OSH, had CT angio chest/abd/pelvis which showed multiple stones in CBD including a 1cm stone distally with intrahepatic ductal dilatation. CTA was negative for dissection and stable aneurysmal dilation of ascending and infrarenal aorta.  At [facility], VSS, labs notable for K+ 3.4, lactate of 2.5 and 2.8 and elevated LFTs.  She received 2L of NS, dilaudid, Zofran.  Surgery team was called there and recommend transfer to [facility] for GI eval and ERCP.   Of note, a right internal jugular TLC was placed at OSH due to inability to establish peripheral vascular access.  Pt still reporting persistent abdominal pain and endorses subjective fevers, chills and dry heaving. 84yoF hx HTN, AAA, depression/anxiety, perforated viscus s/p sigmoid colectomy with ostomy (2014), s/p cholecystectomy, legally blind from macular degeneration, chronic pain syndrome from diffuse OA and spinal stenosis, sent from [facility] ([facility]) for common bile duct stones and need for ERCP. Pt presented to [facility] with a 1 day history of abdominal pain located in her upper quadrants associated with nausea, vomiting with non-bloody, non-bilious emesis and decreased ostomy output.  She then developed chest pain radiating to back at OSH, had CT angio chest/abd/pelvis which showed multiple stones in CBD including a 1cm stone distally with intrahepatic ductal dilatation. CTA was negative for dissection and showed stable aneurysmal dilation of ascending and infrarenal aorta.  At [facility], vital signs stable, labs notable for K+ 3.4, lactate of 2.5 and 2.8 and elevated LFTs.  She received 2L of NS, dilaudid, Zofran.  Surgery team was called there and recommend transfer to [facility] for GI eval and ERCP.   Of note, a right internal jugular TLC was placed at OSH due to inability to establish peripheral vascular access.  Pt still reporting persistent abdominal pain and endorses subjective fevers, chills and dry heaving.

## 2019-11-25 NOTE — ED ADULT TRIAGE NOTE - CHIEF COMPLAINT QUOTE
BIBA from [facility] c/o abdominal pain Need ERCP for Gallstone in CBD. Patient has Triple lumen in Rt IJ confirmed, #22 in L wrist, was given Zosyn and 10mEqs of K+, Dilaudid for pain last given Dilaudid 0.5mg at 0040. Legally blind.

## 2019-11-25 NOTE — ED ADULT NURSE NOTE - OBJECTIVE STATEMENT
[place] [facility] [place] today.  C/o right lower quadrant pain, pt with decreased appetite. She has an ostomy with decreased output. Pt with tripple lumen on right IJ, positive blood return. Pt is NPO at this time.

## 2019-11-25 NOTE — CONSULT NOTE ADULT - ASSESSMENT
84y old Female with a past medical history significant for HTN, AAA, depression/anxiety, perforated viscus s/p sigmoid colectomy with ostomy (2014), s/p cholecystectomy, legally blind from macular degeneration, chronic pain syndrome from diffuse OA and spinal stenosis, sent from [facility] for common bile duct stones and cholangitis.   - monitor LFTs  - cont Zosyn  - NPO   - potassium being repleted; recheck K+  - possible ERCP today    Thanks

## 2019-11-26 DIAGNOSIS — K80.50 CALCULUS OF BILE DUCT WITHOUT CHOLANGITIS OR CHOLECYSTITIS WITHOUT OBSTRUCTION: ICD-10-CM

## 2019-11-26 LAB
ALBUMIN SERPL ELPH-MCNC: 2.9 G/DL — LOW (ref 3.3–5.2)
ALP SERPL-CCNC: 155 U/L — HIGH (ref 40–120)
ALT FLD-CCNC: 157 U/L — HIGH
ANION GAP SERPL CALC-SCNC: 11 MMOL/L — SIGNIFICANT CHANGE UP (ref 5–17)
ANISOCYTOSIS BLD QL: SLIGHT — SIGNIFICANT CHANGE UP
AST SERPL-CCNC: 218 U/L — HIGH
BASOPHILS # BLD AUTO: 0.03 K/UL — SIGNIFICANT CHANGE UP (ref 0–0.2)
BASOPHILS NFR BLD AUTO: 0.3 % — SIGNIFICANT CHANGE UP (ref 0–2)
BILIRUB SERPL-MCNC: 2.2 MG/DL — HIGH (ref 0.4–2)
BUN SERPL-MCNC: 19 MG/DL — SIGNIFICANT CHANGE UP (ref 8–20)
BURR CELLS BLD QL SMEAR: PRESENT — SIGNIFICANT CHANGE UP
BURR CELLS BLD QL SMEAR: SLIGHT — SIGNIFICANT CHANGE UP
CALCIUM SERPL-MCNC: 8.2 MG/DL — LOW (ref 8.6–10.2)
CHLORIDE SERPL-SCNC: 105 MMOL/L — SIGNIFICANT CHANGE UP (ref 98–107)
CO2 SERPL-SCNC: 22 MMOL/L — SIGNIFICANT CHANGE UP (ref 22–29)
CREAT SERPL-MCNC: 0.6 MG/DL — SIGNIFICANT CHANGE UP (ref 0.5–1.3)
E COLI DNA BLD POS QL NAA+NON-PROBE: SIGNIFICANT CHANGE UP
ELLIPTOCYTES BLD QL SMEAR: SLIGHT — SIGNIFICANT CHANGE UP
EOSINOPHIL # BLD AUTO: 0.02 K/UL — SIGNIFICANT CHANGE UP (ref 0–0.5)
EOSINOPHIL NFR BLD AUTO: 0.2 % — SIGNIFICANT CHANGE UP (ref 0–6)
GIANT PLATELETS BLD QL SMEAR: PRESENT — SIGNIFICANT CHANGE UP
GLUCOSE SERPL-MCNC: 81 MG/DL — SIGNIFICANT CHANGE UP (ref 70–115)
HCT VFR BLD CALC: 35.9 % — SIGNIFICANT CHANGE UP (ref 34.5–45)
HGB BLD-MCNC: 11.4 G/DL — LOW (ref 11.5–15.5)
IMM GRANULOCYTES NFR BLD AUTO: 1.9 % — HIGH (ref 0–1.5)
LYMPHOCYTES # BLD AUTO: 0.9 K/UL — LOW (ref 1–3.3)
LYMPHOCYTES # BLD AUTO: 8 % — LOW (ref 13–44)
MACROCYTES BLD QL: SLIGHT — SIGNIFICANT CHANGE UP
MANUAL SMEAR VERIFICATION: SIGNIFICANT CHANGE UP
MCHC RBC-ENTMCNC: 28.5 PG — SIGNIFICANT CHANGE UP (ref 27–34)
MCHC RBC-ENTMCNC: 31.8 GM/DL — LOW (ref 32–36)
MCV RBC AUTO: 89.8 FL — SIGNIFICANT CHANGE UP (ref 80–100)
METHOD TYPE: SIGNIFICANT CHANGE UP
MONOCYTES # BLD AUTO: 0.69 K/UL — SIGNIFICANT CHANGE UP (ref 0–0.9)
MONOCYTES NFR BLD AUTO: 6.2 % — SIGNIFICANT CHANGE UP (ref 2–14)
NEUTROPHILS # BLD AUTO: 9.34 K/UL — HIGH (ref 1.8–7.4)
NEUTROPHILS NFR BLD AUTO: 83.4 % — HIGH (ref 43–77)
NEUTS BAND # BLD: 2.6 % — SIGNIFICANT CHANGE UP (ref 0–8)
OVALOCYTES BLD QL SMEAR: SLIGHT — SIGNIFICANT CHANGE UP
PLAT MORPH BLD: NORMAL — SIGNIFICANT CHANGE UP
PLATELET # BLD AUTO: 73 K/UL — LOW (ref 150–400)
PLATELET COUNT - ESTIMATE: ABNORMAL
POIKILOCYTOSIS BLD QL AUTO: SIGNIFICANT CHANGE UP
POTASSIUM SERPL-MCNC: 4.8 MMOL/L — SIGNIFICANT CHANGE UP (ref 3.5–5.3)
POTASSIUM SERPL-SCNC: 4.8 MMOL/L — SIGNIFICANT CHANGE UP (ref 3.5–5.3)
PROT SERPL-MCNC: 5.3 G/DL — LOW (ref 6.6–8.7)
RBC # BLD: 4 M/UL — SIGNIFICANT CHANGE UP (ref 3.8–5.2)
RBC # FLD: 14.6 % — HIGH (ref 10.3–14.5)
RBC BLD AUTO: ABNORMAL
SODIUM SERPL-SCNC: 138 MMOL/L — SIGNIFICANT CHANGE UP (ref 135–145)
WBC # BLD: 11.19 K/UL — HIGH (ref 3.8–10.5)
WBC # FLD AUTO: 11.19 K/UL — HIGH (ref 3.8–10.5)

## 2019-11-26 PROCEDURE — 99233 SBSQ HOSP IP/OBS HIGH 50: CPT

## 2019-11-26 PROCEDURE — 76937 US GUIDE VASCULAR ACCESS: CPT | Mod: 26,59

## 2019-11-26 PROCEDURE — 99232 SBSQ HOSP IP/OBS MODERATE 35: CPT

## 2019-11-26 PROCEDURE — 99223 1ST HOSP IP/OBS HIGH 75: CPT

## 2019-11-26 PROCEDURE — 36569 INSJ PICC 5 YR+ W/O IMAGING: CPT

## 2019-11-26 RX ORDER — LEVOTHYROXINE SODIUM 125 MCG
1 TABLET ORAL
Qty: 0 | Refills: 0 | DISCHARGE

## 2019-11-26 RX ORDER — ESCITALOPRAM OXALATE 10 MG/1
1 TABLET, FILM COATED ORAL
Qty: 0 | Refills: 0 | DISCHARGE

## 2019-11-26 RX ORDER — ATENOLOL 25 MG/1
1 TABLET ORAL
Qty: 0 | Refills: 0 | DISCHARGE

## 2019-11-26 RX ORDER — ZOLPIDEM TARTRATE 10 MG/1
1 TABLET ORAL
Qty: 0 | Refills: 0 | DISCHARGE

## 2019-11-26 RX ORDER — ALPRAZOLAM 0.25 MG
1 TABLET ORAL
Qty: 0 | Refills: 0 | DISCHARGE

## 2019-11-26 RX ORDER — OXYCODONE HYDROCHLORIDE 5 MG/1
1 TABLET ORAL
Qty: 0 | Refills: 0 | DISCHARGE

## 2019-11-26 RX ORDER — ENOXAPARIN SODIUM 100 MG/ML
40 INJECTION SUBCUTANEOUS DAILY
Refills: 0 | Status: DISCONTINUED | OUTPATIENT
Start: 2019-11-26 | End: 2019-11-29

## 2019-11-26 RX ADMIN — ENOXAPARIN SODIUM 40 MILLIGRAM(S): 100 INJECTION SUBCUTANEOUS at 18:42

## 2019-11-26 RX ADMIN — PIPERACILLIN AND TAZOBACTAM 25 GRAM(S): 4; .5 INJECTION, POWDER, LYOPHILIZED, FOR SOLUTION INTRAVENOUS at 18:21

## 2019-11-26 RX ADMIN — Medication 88 MICROGRAM(S): at 05:54

## 2019-11-26 RX ADMIN — PIPERACILLIN AND TAZOBACTAM 25 GRAM(S): 4; .5 INJECTION, POWDER, LYOPHILIZED, FOR SOLUTION INTRAVENOUS at 11:34

## 2019-11-26 RX ADMIN — ESCITALOPRAM OXALATE 20 MILLIGRAM(S): 10 TABLET, FILM COATED ORAL at 11:04

## 2019-11-26 RX ADMIN — OXYCODONE HYDROCHLORIDE 20 MILLIGRAM(S): 5 TABLET ORAL at 05:56

## 2019-11-26 RX ADMIN — OXYCODONE HYDROCHLORIDE 20 MILLIGRAM(S): 5 TABLET ORAL at 18:21

## 2019-11-26 RX ADMIN — OXYCODONE HYDROCHLORIDE 20 MILLIGRAM(S): 5 TABLET ORAL at 06:52

## 2019-11-26 RX ADMIN — PIPERACILLIN AND TAZOBACTAM 25 GRAM(S): 4; .5 INJECTION, POWDER, LYOPHILIZED, FOR SOLUTION INTRAVENOUS at 01:06

## 2019-11-26 RX ADMIN — Medication 0.25 MILLIGRAM(S): at 01:05

## 2019-11-26 NOTE — PROGRESS NOTE ADULT - SUBJECTIVE AND OBJECTIVE BOX
Pt seen and examined f/u CBD stones    This morning she denies any abdominal pain, nausea or vomiting. LFTs trending down. Yesterday had ERCP, sphincterotomy, removal of at least three large stones and gravel and insertion of convered metallic stent.    REVIEW OF SYSTEMS:    CONSTITUTIONAL: No fever, weight loss, or fatigue  EYES: No eye pain, visual disturbances, or discharge  ENMT:  No difficulty hearing, tinnitus, vertigo; No sinus or throat pain  RESPIRATORY: No cough, wheezing, chills or hemoptysis; No shortness of breath  CARDIOVASCULAR: No chest pain, palpitations, dizziness, or leg swelling  GASTROINTESTINAL: No abdominal or epigastric pain. No nausea, vomiting, or hematemesis; No diarrhea or constipation. No melena or hematochezia.    MEDICATIONS:  MEDICATIONS  (STANDING):  ATENolol  Tablet 25 milliGRAM(s) Oral <User Schedule>  escitalopram 20 milliGRAM(s) Oral daily  levothyroxine 88 MICROGram(s) Oral daily  oxyCODONE  ER Tablet 20 milliGRAM(s) Oral every 12 hours  piperacillin/tazobactam IVPB.. 3.375 Gram(s) IV Intermittent every 8 hours  sodium chloride 0.9%. 1000 milliLiter(s) (75 mL/Hr) IV Continuous <Continuous>    MEDICATIONS  (PRN):  acetaminophen   Tablet .. 650 milliGRAM(s) Oral every 6 hours PRN Mild Pain (1 - 3)  ALPRAZolam 0.25 milliGRAM(s) Oral daily PRN Anxiety  morphine  - Injectable 2 milliGRAM(s) IV Push every 6 hours PRN Moderate Pain (4 - 6)  morphine  - Injectable 4 milliGRAM(s) IV Push every 6 hours PRN Severe Pain (7 - 10)  zolpidem 5 milliGRAM(s) Oral at bedtime PRN Insomnia      Allergies    No Known Allergies    Intolerances        Vital Signs Last 24 Hrs  T(C): 36.6 (26 Nov 2019 05:51), Max: 36.9 (25 Nov 2019 22:38)  T(F): 97.9 (26 Nov 2019 05:51), Max: 98.4 (25 Nov 2019 22:38)  HR: 84 (26 Nov 2019 05:51) (80 - 84)  BP: 116/59 (26 Nov 2019 05:51) (109/59 - 116/59)  BP(mean): --  RR: 18 (26 Nov 2019 05:51) (18 - 18)  SpO2: 94% (26 Nov 2019 05:51) (94% - 94%)      PHYSICAL EXAM:    General: Well developed; well nourished; in no acute distress  HEENT: MMM, conjunctiva and sclera clear, upper and lower dentures, blind  Gastrointestinal:Abdomen: Soft non-tender non-distended; Normal bowel sounds; No hepatosplenomegaly, colostomy in LLQ  Extremities: no cyanosis, clubbing or edema.  Skin: Warm and dry. No obvious rash    LABS:      CBC Full  -  ( 26 Nov 2019 05:59 )  WBC Count : 11.19 K/uL  RBC Count : 4.00 M/uL  Hemoglobin : 11.4 g/dL  Hematocrit : 35.9 %  Platelet Count - Automated : 73 K/uL  Mean Cell Volume : 89.8 fl  Mean Cell Hemoglobin : 28.5 pg  Mean Cell Hemoglobin Concentration : 31.8 gm/dL  Auto Neutrophil # : 9.34 K/uL  Auto Lymphocyte # : 0.90 K/uL  Auto Monocyte # : 0.69 K/uL  Auto Eosinophil # : 0.02 K/uL  Auto Basophil # : 0.03 K/uL  Auto Neutrophil % : 83.4 %  Auto Lymphocyte % : 8.0 %  Auto Monocyte % : 6.2 %  Auto Eosinophil % : 0.2 %  Auto Basophil % : 0.3 %    11-26    138  |  105  |  19.0  ----------------------------<  81  4.8   |  22.0  |  0.60    Ca    8.2<L>      26 Nov 2019 05:59    TPro  5.3<L>  /  Alb  2.9<L>  /  TBili  2.2<H>  /  DBili  x   /  AST  218<H>  /  ALT  157<H>  /  AlkPhos  155<H>  11-26    PT/INR - ( 25 Nov 2019 04:31 )   PT: 12.8 sec;   INR: 1.11 ratio         PTT - ( 25 Nov 2019 04:31 )  PTT:29.0 sec

## 2019-11-26 NOTE — CHART NOTE - NSCHARTNOTEFT_GEN_A_CORE
Right IJ CVP taken out after R Midline catheter placed.  patient tolerated well   pressure applied 15 minutes  no bleeding noted   dressing applied  notified RN to followup in 15 minutes

## 2019-11-26 NOTE — PROGRESS NOTE ADULT - SUBJECTIVE AND OBJECTIVE BOX
IVY GARRISON Female 84y [id number]    Patient is a 84y old  Female who presents with a chief complaint of Choledocholithiasis (26 Nov 2019 07:56)      Subjective/objective:  Pt seen and examined at bedside, no over night event reported by night staff. Pt reports no abdomen pain/ nausea or vomiting.  Yesterday had ERCP, sphincterotomy, removal of at least three large stones and gravel and insertion of metallic stent.      Review of system:  No fever, chills, nausea, vomiting, headache, dizziness, chest pain.      PHYSICAL EXAM:    Vital Signs Last 24 Hrs  T(C): 37.3 (26 Nov 2019 09:09), Max: 37.3 (26 Nov 2019 09:09)  T(F): 99.1 (26 Nov 2019 09:09), Max: 99.1 (26 Nov 2019 09:09)  HR: 72 (26 Nov 2019 09:09) (72 - 84)  BP: 133/79 (26 Nov 2019 09:09) (109/59 - 133/79)  BP(mean): --  RR: 18 (26 Nov 2019 09:09) (18 - 18)  SpO2: 93% (26 Nov 2019 09:09) (93% - 94%)    GENERAL: Pt lying comfortably, NAD. Old age lady.  NECK: soft, Supple, No JVD, Central line in place.   CHEST/LUNG: Clear to auscultation bilaterally; No wheezing.  HEART: S1S2+, Regular rate and rhythm; No murmurs.  ABDOMEN: Soft, Nontender, Nondistended; Bowel sounds present.  Extremities: No LE edema, pulses+  NEURO: AAOX3, no focal deficits, no motor r sensory loss.  PSYCH: normal mood.    MEDICATIONS  (STANDING):  ATENolol  Tablet 25 milliGRAM(s) Oral <User Schedule>  escitalopram 20 milliGRAM(s) Oral daily  levothyroxine 88 MICROGram(s) Oral daily  oxyCODONE  ER Tablet 20 milliGRAM(s) Oral every 12 hours  piperacillin/tazobactam IVPB.. 3.375 Gram(s) IV Intermittent every 8 hours  sodium chloride 0.9%. 1000 milliLiter(s) (75 mL/Hr) IV Continuous <Continuous>    MEDICATIONS  (PRN):  acetaminophen   Tablet .. 650 milliGRAM(s) Oral every 6 hours PRN Mild Pain (1 - 3)  ALPRAZolam 0.25 milliGRAM(s) Oral daily PRN Anxiety  morphine  - Injectable 2 milliGRAM(s) IV Push every 6 hours PRN Moderate Pain (4 - 6)  morphine  - Injectable 4 milliGRAM(s) IV Push every 6 hours PRN Severe Pain (7 - 10)  zolpidem 5 milliGRAM(s) Oral at bedtime PRN Insomnia        Labs:  LABS:                        11.4   11.19 )-----------( 73       ( 26 Nov 2019 05:59 )             35.9     11-26    138  |  105  |  19.0  ----------------------------<  81  4.8   |  22.0  |  0.60    Ca    8.2<L>      26 Nov 2019 05:59    TPro  5.3<L>  /  Alb  2.9<L>  /  TBili  2.2<H>  /  DBili  x   /  AST  218<H>  /  ALT  157<H>  /  AlkPhos  155<H>  11-26    PT/INR - ( 25 Nov 2019 04:31 )   PT: 12.8 sec;   INR: 1.11 ratio         PTT - ( 25 Nov 2019 04:31 )  PTT:29.0 sec    LIVER FUNCTIONS - ( 26 Nov 2019 05:59 )  Alb: 2.9 g/dL / Pro: 5.3 g/dL / ALK PHOS: 155 U/L / ALT: 157 U/L / AST: 218 U/L / GGT: x

## 2019-11-26 NOTE — CONSULT NOTE ADULT - SUBJECTIVE AND OBJECTIVE BOX
INFECTIOUS DISEASES AND INTERNAL MEDICINE at [place]  =======================================================  Librado Golden MD  Diplomates American Board of Internal Medicine and Infectious Diseases  Telephone [phone]  Fax            [phone]  =======================================================    IVY [id number]      HPI:  84yoF hx HTN, AAA, depression/anxiety, perforated viscus s/p sigmoid colectomy with ostomy (2014), s/p cholecystectomy, legally blind from macular degeneration, chronic pain syndrome from diffuse OA and spinal stenosis, sent [place] [facility] [place] for common bile duct stones and need for ERCP. Pt presented to [facility] with a 1 day history of abdominal pain located in her upper quadrants associated with nausea, vomiting with non-bloody, non-bilious emesis and decreased ostomy output.  She then developed chest pain radiating to back at OSH, had CT angio chest/abd/pelvis which showed multiple stones in CBD including a 1cm stone distally with intrahepatic ductal dilatation. CTA was negative for dissection and showed stable aneurysmal dilation of ascending and infrarenal aorta.  [place] [facility] vital signs [place], labs notable for K+ 3.4, lactate of 2.5 and 2.8 and elevated LFTs.  She received 2L of NS, dilaudid, Zofran.  Surgery team was called there and recommend transfer to [facility] for GI eval and ERCP.     PT S/P ERCP AND STENT    BLOOD CX WITH GM NEG RODS   ASKED TO EVALUATE FROM ID STANDPOINT          PAST MEDICAL & SURGICAL HISTORY:  Spinal stenosis  Osteoarthritis  Chronic back pain  Cataract  Macular degeneration  Colostomy present  Perforated abdominal viscus: spontaneous rupture as per patient  Hypothyroidism  Depression with anxiety  AAA (abdominal aortic aneurysm): ascending aortic and infrarenal aneurysms  Hypertension  S/P appendectomy: 2013  S/P cataract surgery: right eye  S/P cholecystectomy  S/P colostomy: 2014  H/O colectomy: sigmoid colectomy      ANTIBIOTICS  piperacillin/tazobactam IVPB.. 3.375 Gram(s) IV Intermittent every 8 hours      Allergies    No Known Allergies    Intolerances        SOCIAL HISTORY:     FAMILY HX   FAMILY HISTORY:  No pertinent family history in first degree relatives      Vital Signs Last 24 Hrs  T(C): 37.3 (26 Nov 2019 09:09), Max: 37.3 (26 Nov 2019 09:09)  T(F): 99.1 (26 Nov 2019 09:09), Max: 99.1 (26 Nov 2019 09:09)  HR: 72 (26 Nov 2019 09:09) (72 - 84)  BP: 133/79 (26 Nov 2019 09:09) (109/59 - 133/79)  BP(mean): --  RR: 18 (26 Nov 2019 09:09) (18 - 18)  SpO2: 93% (26 Nov 2019 09:09) (93% - 94%)  Drug Dosing Weight  Height (cm): 160.02 (25 Nov 2019 01:54)  Weight (kg): 54.4 (25 Nov 2019 01:54)  BMI (kg/m2): 21.2 (25 Nov 2019 01:54)  BSA (m2): 1.56 (25 Nov 2019 01:54)      REVIEW OF SYSTEMS:    CONSTITUTIONAL:  As per HPI.    HEENT:  Eyes:  No diplopia or blurred vision. ENT:  No earache, sore throat or runny nose.    CARDIOVASCULAR:  No pressure, squeezing, strangling, tightness, heaviness or aching about the chest, neck, axilla or epigastrium.    RESPIRATORY:  No cough, shortness of breath, PND or orthopnea.    GASTROINTESTINAL:  No nausea, vomiting or diarrhea.    GENITOURINARY:  No dysuria, frequency or urgency.    MUSCULOSKELETAL:  As per HPI.    SKIN:  No change in skin, hair or nails.    NEUROLOGIC:  No paresthesias, fasciculations, seizures or weakness.                  PHYSICAL EXAMINATION:    GENERAL: The patient is a well-developed, well-nourished _____in no apparent distress. ___ is alert and oriented x3.    VITAL SIGNS: T(C): 37.3 (11-26-19 @ 09:09), Max: 37.3 (11-26-19 @ 09:09)  HR: 72 (11-26-19 @ 09:09) (72 - 84)  BP: 133/79 (11-26-19 @ 09:09) (109/59 - 133/79)  RR: 18 (11-26-19 @ 09:09) (18 - 18)  SpO2: 93% (11-26-19 @ 09:09) (93% - 94%)  Wt(kg): --    HEENT: Head is normocephalic and atraumatic.  ANICTERIC  NECK: Supple. No carotid bruits.  No lymphadenopathy or thyromegaly.    LUNGS:COARSE BREATH SOUNDS    HEART: Regular rate and rhythm without murmur.    ABDOMEN: Soft, nontender, and nondistended.  Positive bowel sounds.  No hepatosplenomegaly was noted. NO REBOUND NO GUARDING    EXTREMITIES: NO EDEMA NO ERYTHEMA    NEUROLOGIC: NON FOCAL      SKIN: No ulceration or induration present. NO RASH        BLOOD CULTURES  Culture Results:   Growth in aerobic bottle: Gram Negative Rods  Aerobic Bottle: 14.35 Hours to positivity  Anaerobic Bottle: No growth to date  .  ***Blood Panel PCR results on this specimen are available  approximately 3 hours after the Gram stain result.***  Gram stain, PCR, and/or culture results may not always  correspond due to difference in methodologies.  ************************************************************  This PCR assay was performed using [employer].  The following targets are tested for: Enterococcus,  vancomycin resistant enterococci, Listeria monocytogenes,  coagulase negative staphylococci, S. aureus,  methicillin resistant S. aureus, Streptococcus agalactiae  (Group B), S. pneumoniae, S. pyogenes (Group A),  Acinetobacter baumannii, Enterobacter cloacae, E. coli,  Klebsiella oxytoca, K. pneumoniae, Proteus sp.,  Serratia marcescens, Haemophilus influenzae,  Neisseria meningitidis, Pseudomonas aeruginosa, Candida  albicans, C. glabrata, C krusei, C parapsilosis,  C. tropicalis and the KPC resistance gene.  "Due to technical problems, Proteus sp. will Not be reported as part of  the BCID panel until further notice"  .  TYPE: (C=Critical, N=Notification, A=Abnormal) C  TESTS:  _ GS BLD  DATE/TIME CALLED: _ 11/25/2019 23:12:03  CALLED TO: _ RE Cisneros  READ BACK (2 Patient Identifiers)(Y/N): _ Y  READ BACK VALUES (Y/N): _ Y  CALLED BY: _ NLUPO (11-25 @ 06:32)       URINE CX          LABS:                        11.4   11.19 )-----------( 73       ( 26 Nov 2019 05:59 )             35.9     11-26    138  |  105  |  19.0  ----------------------------<  81  4.8   |  22.0  |  0.60    Ca    8.2<L>      26 Nov 2019 05:59    TPro  5.3<L>  /  Alb  2.9<L>  /  TBili  2.2<H>  /  DBili  x   /  AST  218<H>  /  ALT  157<H>  /  AlkPhos  155<H>  11-26    PT/INR - ( 25 Nov 2019 04:31 )   PT: 12.8 sec;   INR: 1.11 ratio         PTT - ( 25 Nov 2019 04:31 )  PTT:29.0 sec      RADIOLOGY & ADDITIONAL STUDIES:      ASSESSMENT/PLAN    84yoF hx HTN, AAA, depression/anxiety, perforated viscus s/p sigmoid colectomy with ostomy (2014), s/p cholecystectomy, legally blind from macular degeneration, chronic pain syndrome from diffuse OA and spinal stenosis, sent [place] [facility] [place] for common bile duct stones and need for ERCP. Pt presented to [facility] with a 1 day history of abdominal pain located in her upper quadrants associated with nausea, vomiting with non-bloody, non-bilious emesis and decreased ostomy output.  She then developed chest pain radiating to back at OSH, had CT angio chest/abd/pelvis which showed multiple stones in CBD including a 1cm stone distally with intrahepatic ductal dilatation. CTA was negative for dissection and showed stable aneurysmal dilation of ascending and infrarenal aorta.  [place] [facility] vital signs [place], labs notable for K+ 3.4, lactate of 2.5 and 2.8 and elevated LFTs.  She received 2L of NS, dilaudid, Zofran.  Surgery team was called there and recommend transfer to [facility] for GI eval and ERCP.     PT S/P ERCP AND STENT    BLOOD CX WITH GM NEG RODS    PT PLACED ON ZOSYN  WILL FOLLOW UP CULTURES   WOULD REPEAT BLOOD CX X2   WILL FOLLOW UP WITH FURTHER RECOMMENDATIONS                MO MERCADO MD

## 2019-11-26 NOTE — PROCEDURE NOTE - NSPROCDETAILS_GEN_ALL_CORE
sterile technique, catheter placed/ultrasound assessment/supine position/ultrasound guidance/location identified, draped/prepped, sterile technique used/sterile dressing applied

## 2019-11-26 NOTE — PATIENT PROFILE ADULT - BRADEN SENSORY
Subjective   Rosanne León is a 7 y.o. female.   Chief Complaint   Patient presents with   • Impetigo     face     Rosanne Is brought in today by her father for concerns of rash on her face.  Father states about 2 weeks ago, patient was diagnosed with ringworm on her chin and started on topical antifungals.  Since that time.  Area has become more ulcerated, scabbed.  Father states she picks at the area frequently causing it to bleed and weeping clear fluid.  Rash has not spread to any other areas of the body.  No one else in the home has had any type of rash.  Denies any history of MRSA or other type of skin infection.  She's been afebrile with a good appetite, taking fluids with good urine output.  Denies any bowel changes, nuchal junk, urinary symptoms.  Denies any shortness of breath, wheezing.  She was seen in urgent care about 3 days ago and diagnosed with impetigo, started on oral antibiotics, Ceftin, and topical antibiotic cream.  Father states he cannot tell any difference since starting medications.    Rash   This is a new problem. The current episode started 1 to 4 weeks ago. The problem has been gradually worsening since onset. The affected locations include the face. The problem is moderate. The rash is characterized by redness, peeling and dryness. She was exposed to nothing. The rash first occurred at home. Pertinent negatives include no anorexia, congestion, cough, decreased physical activity, decreased sleep, drinking less, diarrhea, facial edema, fever, rhinorrhea or vomiting. Past treatments include antibiotics and antibiotic cream. The treatment provided mild relief. There is no history of allergies, asthma, eczema or varicella. There were no sick contacts.        The following portions of the patient's history were reviewed and updated as appropriate: allergies, current medications, past family history, past medical history, past social history, past surgical history and problem  "list.    Review of Systems   Constitutional: Negative.  Negative for activity change, appetite change and fever.   HENT: Negative.  Negative for congestion and rhinorrhea.    Eyes: Negative.    Respiratory: Negative.  Negative for cough.    Cardiovascular: Negative.    Gastrointestinal: Negative.  Negative for anorexia, diarrhea and vomiting.   Endocrine: Negative.    Genitourinary: Negative.    Musculoskeletal: Negative.    Skin: Positive for rash.   Allergic/Immunologic: Negative.    Neurological: Negative.    Hematological: Negative.    Psychiatric/Behavioral: Negative.        Objective    Temp 98 °F (36.7 °C)  Ht 44.25\" (112.4 cm)  Wt 40 lb 4 oz (18.3 kg)  BMI 14.45 kg/m2    Physical Exam   Constitutional: She appears well-developed and well-nourished. She is active.   HENT:   Head: Atraumatic.   Right Ear: Tympanic membrane normal.   Left Ear: Tympanic membrane normal.   Nose: Nose normal.   Mouth/Throat: Mucous membranes are moist. Oropharynx is clear.   Eyes: Conjunctivae, EOM and lids are normal. Visual tracking is normal. Pupils are equal, round, and reactive to light.   Neck: Normal range of motion. Neck supple. No rigidity.   Cardiovascular: Normal rate, regular rhythm, S1 normal and S2 normal.  Pulses are strong and palpable.    Pulmonary/Chest: Effort normal and breath sounds normal. There is normal air entry. No stridor. No respiratory distress. Air movement is not decreased. She has no wheezes. She has no rhonchi. She has no rales. She exhibits no retraction.   Abdominal: Soft. Bowel sounds are normal. She exhibits no mass.   Musculoskeletal: Normal range of motion.   Lymphadenopathy:     She has no cervical adenopathy.   Neurological: She is alert.   Skin: Skin is warm and dry. Capillary refill takes less than 3 seconds. Rash noted. No pallor.        Psychiatric: She has a normal mood and affect. Her behavior is normal.   Nursing note and vitals reviewed.      Assessment/Plan   Rosanne was seen " today for impetigo.    Diagnoses and all orders for this visit:    Follow up    Impetigo  -     cefuroxime (CEFTIN) 250 MG/5ML suspension; Take 5 mL by mouth 2 (Two) Times a Day for 10 days.      Continue oral and topical antibiotics as prescribed at urgent care.   Discussed good hand washing, prevention of transmission.   To keep clean, ok to cover if patient is picking at scabs frequently.   Bendadryl every 6 hours as needed for itching.   Return to clinic if symptoms worsen or do not improve. Discussed s/s warranting ER presentation.             (3) slightly limited

## 2019-11-26 NOTE — PROGRESS NOTE ADULT - PROBLEM SELECTOR PLAN 1
now post ERCP with removal of stones and insertion of metallic stent. Will restart soft diet and if tolerates and labs OK in AM would D/C in AM. Will need repeat ERCP in 6 weeks with removal of stent with Dr. Palafox.

## 2019-11-27 DIAGNOSIS — A41.50 GRAM-NEGATIVE SEPSIS, UNSPECIFIED: ICD-10-CM

## 2019-11-27 LAB
-  AMIKACIN: SIGNIFICANT CHANGE UP
-  AMPICILLIN/SULBACTAM: SIGNIFICANT CHANGE UP
-  AMPICILLIN: SIGNIFICANT CHANGE UP
-  AZTREONAM: SIGNIFICANT CHANGE UP
-  CEFAZOLIN: SIGNIFICANT CHANGE UP
-  CEFEPIME: SIGNIFICANT CHANGE UP
-  CEFOXITIN: SIGNIFICANT CHANGE UP
-  CEFTRIAXONE: SIGNIFICANT CHANGE UP
-  CIPROFLOXACIN: SIGNIFICANT CHANGE UP
-  ERTAPENEM: SIGNIFICANT CHANGE UP
-  GENTAMICIN: SIGNIFICANT CHANGE UP
-  IMIPENEM: SIGNIFICANT CHANGE UP
-  LEVOFLOXACIN: SIGNIFICANT CHANGE UP
-  MEROPENEM: SIGNIFICANT CHANGE UP
-  PIPERACILLIN/TAZOBACTAM: SIGNIFICANT CHANGE UP
-  TOBRAMYCIN: SIGNIFICANT CHANGE UP
-  TRIMETHOPRIM/SULFAMETHOXAZOLE: SIGNIFICANT CHANGE UP
ALBUMIN SERPL ELPH-MCNC: 2.8 G/DL — LOW (ref 3.3–5.2)
ALP SERPL-CCNC: 136 U/L — HIGH (ref 40–120)
ALT FLD-CCNC: 93 U/L — HIGH
ANION GAP SERPL CALC-SCNC: 10 MMOL/L — SIGNIFICANT CHANGE UP (ref 5–17)
AST SERPL-CCNC: 84 U/L — HIGH
BILIRUB DIRECT SERPL-MCNC: 0.6 MG/DL — HIGH (ref 0–0.3)
BILIRUB INDIRECT FLD-MCNC: 0.5 MG/DL — SIGNIFICANT CHANGE UP (ref 0.2–1)
BILIRUB SERPL-MCNC: 1.1 MG/DL — SIGNIFICANT CHANGE UP (ref 0.4–2)
BUN SERPL-MCNC: 17 MG/DL — SIGNIFICANT CHANGE UP (ref 8–20)
CALCIUM SERPL-MCNC: 8.4 MG/DL — LOW (ref 8.6–10.2)
CHLORIDE SERPL-SCNC: 106 MMOL/L — SIGNIFICANT CHANGE UP (ref 98–107)
CO2 SERPL-SCNC: 22 MMOL/L — SIGNIFICANT CHANGE UP (ref 22–29)
CREAT SERPL-MCNC: 0.45 MG/DL — LOW (ref 0.5–1.3)
GLUCOSE SERPL-MCNC: 84 MG/DL — SIGNIFICANT CHANGE UP (ref 70–115)
HCT VFR BLD CALC: 35.2 % — SIGNIFICANT CHANGE UP (ref 34.5–45)
HGB BLD-MCNC: 11.2 G/DL — LOW (ref 11.5–15.5)
MCHC RBC-ENTMCNC: 28.9 PG — SIGNIFICANT CHANGE UP (ref 27–34)
MCHC RBC-ENTMCNC: 31.8 GM/DL — LOW (ref 32–36)
MCV RBC AUTO: 90.7 FL — SIGNIFICANT CHANGE UP (ref 80–100)
METHOD TYPE: SIGNIFICANT CHANGE UP
PLATELET # BLD AUTO: 62 K/UL — LOW (ref 150–400)
POTASSIUM SERPL-MCNC: 3.9 MMOL/L — SIGNIFICANT CHANGE UP (ref 3.5–5.3)
POTASSIUM SERPL-SCNC: 3.9 MMOL/L — SIGNIFICANT CHANGE UP (ref 3.5–5.3)
PROT SERPL-MCNC: 5.2 G/DL — LOW (ref 6.6–8.7)
RBC # BLD: 3.88 M/UL — SIGNIFICANT CHANGE UP (ref 3.8–5.2)
RBC # FLD: 14.6 % — HIGH (ref 10.3–14.5)
SODIUM SERPL-SCNC: 138 MMOL/L — SIGNIFICANT CHANGE UP (ref 135–145)
WBC # BLD: 7.36 K/UL — SIGNIFICANT CHANGE UP (ref 3.8–10.5)
WBC # FLD AUTO: 7.36 K/UL — SIGNIFICANT CHANGE UP (ref 3.8–10.5)

## 2019-11-27 PROCEDURE — 93010 ELECTROCARDIOGRAM REPORT: CPT

## 2019-11-27 PROCEDURE — 99233 SBSQ HOSP IP/OBS HIGH 50: CPT

## 2019-11-27 PROCEDURE — 99232 SBSQ HOSP IP/OBS MODERATE 35: CPT

## 2019-11-27 RX ORDER — SENNA PLUS 8.6 MG/1
2 TABLET ORAL AT BEDTIME
Refills: 0 | Status: DISCONTINUED | OUTPATIENT
Start: 2019-11-27 | End: 2019-11-29

## 2019-11-27 RX ORDER — DOCUSATE SODIUM 100 MG
100 CAPSULE ORAL
Refills: 0 | Status: DISCONTINUED | OUTPATIENT
Start: 2019-11-27 | End: 2019-11-29

## 2019-11-27 RX ORDER — SACCHAROMYCES BOULARDII 250 MG
250 POWDER IN PACKET (EA) ORAL
Refills: 0 | Status: DISCONTINUED | OUTPATIENT
Start: 2019-11-27 | End: 2019-11-29

## 2019-11-27 RX ORDER — CEFTRIAXONE 500 MG/1
2000 INJECTION, POWDER, FOR SOLUTION INTRAMUSCULAR; INTRAVENOUS EVERY 24 HOURS
Refills: 0 | Status: DISCONTINUED | OUTPATIENT
Start: 2019-11-27 | End: 2019-11-29

## 2019-11-27 RX ORDER — SODIUM CHLORIDE 9 MG/ML
500 INJECTION INTRAMUSCULAR; INTRAVENOUS; SUBCUTANEOUS ONCE
Refills: 0 | Status: COMPLETED | OUTPATIENT
Start: 2019-11-27 | End: 2019-11-27

## 2019-11-27 RX ORDER — POLYETHYLENE GLYCOL 3350 17 G/17G
17 POWDER, FOR SOLUTION ORAL DAILY
Refills: 0 | Status: DISCONTINUED | OUTPATIENT
Start: 2019-11-27 | End: 2019-11-29

## 2019-11-27 RX ORDER — ATENOLOL 25 MG/1
25 TABLET ORAL ONCE
Refills: 0 | Status: COMPLETED | OUTPATIENT
Start: 2019-11-27 | End: 2019-11-27

## 2019-11-27 RX ORDER — ONDANSETRON 8 MG/1
4 TABLET, FILM COATED ORAL EVERY 6 HOURS
Refills: 0 | Status: DISCONTINUED | OUTPATIENT
Start: 2019-11-27 | End: 2019-11-29

## 2019-11-27 RX ADMIN — OXYCODONE HYDROCHLORIDE 20 MILLIGRAM(S): 5 TABLET ORAL at 06:35

## 2019-11-27 RX ADMIN — MORPHINE SULFATE 2 MILLIGRAM(S): 50 CAPSULE, EXTENDED RELEASE ORAL at 22:00

## 2019-11-27 RX ADMIN — Medication 650 MILLIGRAM(S): at 04:13

## 2019-11-27 RX ADMIN — ATENOLOL 25 MILLIGRAM(S): 25 TABLET ORAL at 22:18

## 2019-11-27 RX ADMIN — SODIUM CHLORIDE 500 MILLILITER(S): 9 INJECTION INTRAMUSCULAR; INTRAVENOUS; SUBCUTANEOUS at 22:29

## 2019-11-27 RX ADMIN — PIPERACILLIN AND TAZOBACTAM 25 GRAM(S): 4; .5 INJECTION, POWDER, LYOPHILIZED, FOR SOLUTION INTRAVENOUS at 02:15

## 2019-11-27 RX ADMIN — Medication 0.25 MILLIGRAM(S): at 00:08

## 2019-11-27 RX ADMIN — SENNA PLUS 2 TABLET(S): 8.6 TABLET ORAL at 21:20

## 2019-11-27 RX ADMIN — OXYCODONE HYDROCHLORIDE 20 MILLIGRAM(S): 5 TABLET ORAL at 06:01

## 2019-11-27 RX ADMIN — ESCITALOPRAM OXALATE 20 MILLIGRAM(S): 10 TABLET, FILM COATED ORAL at 10:02

## 2019-11-27 RX ADMIN — CEFTRIAXONE 100 MILLIGRAM(S): 500 INJECTION, POWDER, FOR SOLUTION INTRAMUSCULAR; INTRAVENOUS at 14:52

## 2019-11-27 RX ADMIN — Medication 88 MICROGRAM(S): at 06:02

## 2019-11-27 RX ADMIN — OXYCODONE HYDROCHLORIDE 20 MILLIGRAM(S): 5 TABLET ORAL at 17:10

## 2019-11-27 RX ADMIN — Medication 100 MILLIGRAM(S): at 17:10

## 2019-11-27 RX ADMIN — PIPERACILLIN AND TAZOBACTAM 25 GRAM(S): 4; .5 INJECTION, POWDER, LYOPHILIZED, FOR SOLUTION INTRAVENOUS at 10:02

## 2019-11-27 RX ADMIN — Medication 0.25 MILLIGRAM(S): at 22:18

## 2019-11-27 RX ADMIN — Medication 650 MILLIGRAM(S): at 05:00

## 2019-11-27 RX ADMIN — Medication 250 MILLIGRAM(S): at 17:10

## 2019-11-27 RX ADMIN — ENOXAPARIN SODIUM 40 MILLIGRAM(S): 100 INJECTION SUBCUTANEOUS at 21:20

## 2019-11-27 RX ADMIN — MORPHINE SULFATE 2 MILLIGRAM(S): 50 CAPSULE, EXTENDED RELEASE ORAL at 21:21

## 2019-11-27 NOTE — ED ADULT NURSE REASSESSMENT NOTE - NS ED NURSE REASSESS COMMENT FT1
Admitting physician at pt bedside at this time, POC discussed with pt who verbalize understanding and agree, pt reporting constipation, awaiting further orders. Pt actively eating a warm meal tray, tolerating well, safety and comfort maintained.

## 2019-11-27 NOTE — PROGRESS NOTE ADULT - PROBLEM SELECTOR PLAN 2
IV antibiotic therapy as per ID. See above managemnt plan recommendations. IV antibiotic therapy as per ID. See above management plan recommendations.

## 2019-11-27 NOTE — PROGRESS NOTE ADULT - SUBJECTIVE AND OBJECTIVE BOX
[facility]  INFECTIOUS DISEASES AND INTERNAL MEDICINE at [facility]  =======================================================  Librado Golden MD  Diplomates American Board of Internal Medicine and Infectious Diseases  =======================================================    IVY GARRISON [id number]    Follow up: Bacteremia and cholangitis     Feels better, no abdominal pain. No fever, no diarrhea.     Allergies:  No Known Allergies    Antibiotics:  zosyn     REVIEW OF SYSTEMS:  CONSTITUTIONAL:  No Fever or chills  HEENT:   No diplopia or blurred vision.  No earache, sore throat or runny nose.  CARDIOVASCULAR:  No chest pain or SOB  RESPIRATORY:  No cough, shortness of breath, PND or orthopnea.  GASTROINTESTINAL:  No nausea, vomiting or diarrhea.  GENITOURINARY:  No dysuria, frequency or urgency. No Blood in urine  MUSCULOSKELETAL:  no joint aches, no muscle pain  SKIN:  No change in skin, hair or nails.  NEUROLOGIC:  No paresthesias or weakness.  PSYCHIATRIC:  No disorder of thought or mood.  ENDOCRINE:  No heat or cold intolerance, polyuria or polydipsia.  HEMATOLOGICAL:  No easy bruising or bleeding.      Physical Exam:  ICU Vital Signs Last 24 Hrs  T(C): 36.8 (27 Nov 2019 15:46), Max: 36.8 (27 Nov 2019 07:30)  T(F): 98.2 (27 Nov 2019 15:46), Max: 98.3 (27 Nov 2019 07:30)  HR: 62 (27 Nov 2019 15:46) (56 - 65)  BP: 138/82 (27 Nov 2019 15:46) (123/73 - 158/94)  RR: 18 (27 Nov 2019 15:46) (18 - 18)  SpO2: 99% (27 Nov 2019 15:46) (93% - 99%)  GEN: NAD  HEENT: normocephalic and atraumatic. EOMI. DOMINIK.    NECK: Supple.  No lymphadenopathy   LUNGS: Clear to auscultation.  HEART: Regular rate and rhythm   ABDOMEN: Soft, nontender, and nondistended.  Positive bowel sounds. ostomy bag with some solid stool    : No CVA tenderness  EXTREMITIES: Without edema.  MSK: no joint swelling  NEUROLOGIC: grossly intact.  PSYCHIATRIC: Appropriate affect .  SKIN: No ulceration or induration present.      Labs:  11-27    138  |  106  |  17.0  ----------------------------<  84  3.9   |  22.0  |  0.45<L>    Ca    8.4<L>      27 Nov 2019 05:55    TPro  5.2<L>  /  Alb  2.8<L>  /  TBili  1.1  /  DBili  0.6<H>  /  AST  84<H>  /  ALT  93<H>  /  AlkPhos  136<H>  11-27                        11.2   7.36  )-----------( 62       ( 27 Nov 2019 05:55 )             35.2     LIVER FUNCTIONS - ( 27 Nov 2019 05:55 )  Alb: 2.8 g/dL / Pro: 5.2 g/dL / ALK PHOS: 136 U/L / ALT: 93 U/L / AST: 84 U/L / GGT: x           RECENT CULTURES:  11-25 @ 06:33 .Blood     No growth at 48 hours    11-25 @ 06:32 .Blood Escherichia coli  Blood Culture PCR    Growth in aerobic bottle: Escherichia coli  Aerobic Bottle: 14.35 Hours to positivity  Anaerobic Bottle: No growth to date    All imaging and data are reviewed.     Assessment and plan:   84yoF hx HTN, AAA, depression/anxiety, perforated viscus s/p sigmoid colectomy with ostomy (2014), s/p cholecystectomy, legally blind from macular degeneration, chronic pain syndrome from diffuse OA and spinal stenosis, sent from [facility] ([facility]) for common bile duct stones and need for ERCP. Pt presented to [facility] with a 1 day history of abdominal pain located in her upper quadrants associated with nausea, vomiting with non-bloody, non-bilious emesis and decreased ostomy output.  She then developed chest pain radiating to back at OSH, had CT angio chest/abd/pelvis which showed multiple stones in CBD including a 1cm stone distally with intrahepatic ductal dilatation. S/P ERCP and stent.     Cholangitis CBD stone   Bacteremia with Ecoli    - Blood culture with Ecoli on 11/25  - Repeat blood culture pending  - S/P ERCP and stent   - Ecoli S to ceftriaxone so will stop zosyn and start ceftriaxone 2gm daily  - 2weeks treatment with ceftriaxone from first neg blood culture     Will follow.

## 2019-11-27 NOTE — PROGRESS NOTE ADULT - PROBLEM SELECTOR PLAN 1
Status post ERCP with CBD stone removal and placement of a covered metal stent. CBD not completely cleared therefor biliary stent placed. This will need to be removed and CBD cleared in 4 to 6 weeks. Now with GNR bacteremia. IV antibiotics as per ID. LFT's improving. Would continue to follow. Diet as tolerated. Status post ERCP with CBD stone removal and placement of a covered metal stent. CBD not completely cleared therefor biliary stent placed. This will need to be removed and CBD cleared in 4 to 6 weeks. Now with GNR bacteremia. IV antibiotics as per ID. LFT's improving. Would continue to follow. Diet as tolerated. Pt with thrombocytopenia likely secondary to GNR bacteremia. Repeat CMP and cbc and PT / INR ordered for the AM.

## 2019-11-27 NOTE — PROGRESS NOTE ADULT - SUBJECTIVE AND OBJECTIVE BOX
Pt seen and examined. Blood Cultures + for GNR. Seen by ID yesterday and started on IV Zosyn. Pt has c/o mild upper abdominal pain. Platelet count dropping possibly secondary to bacteremia. Midline catheter placed yesterday.     REVIEW OF SYSTEMS:  Constitutional: No fever, weight loss or fatigue  Cardiovascular: No chest pain, palpitations, dizziness or leg swelling  Gastrointestinal:  As noted above.  Skin: No itching, burning, rashes or lesions       MEDICATIONS:  MEDICATIONS  (STANDING):  ATENolol  Tablet 25 milliGRAM(s) Oral <User Schedule>  enoxaparin Injectable 40 milliGRAM(s) SubCutaneous daily  escitalopram 20 milliGRAM(s) Oral daily  levothyroxine 88 MICROGram(s) Oral daily  oxyCODONE  ER Tablet 20 milliGRAM(s) Oral every 12 hours  piperacillin/tazobactam IVPB.. 3.375 Gram(s) IV Intermittent every 8 hours  sodium chloride 0.9%. 1000 milliLiter(s) (75 mL/Hr) IV Continuous <Continuous>    MEDICATIONS  (PRN):  acetaminophen   Tablet .. 650 milliGRAM(s) Oral every 6 hours PRN Mild Pain (1 - 3)  ALPRAZolam 0.25 milliGRAM(s) Oral daily PRN Anxiety  morphine  - Injectable 2 milliGRAM(s) IV Push every 6 hours PRN Moderate Pain (4 - 6)  morphine  - Injectable 4 milliGRAM(s) IV Push every 6 hours PRN Severe Pain (7 - 10)  zolpidem 5 milliGRAM(s) Oral at bedtime PRN Insomnia      Allergies    No Known Allergies    Intolerances        Vital Signs Last 24 Hrs  T(C): 36.8 (27 Nov 2019 07:30), Max: 37.3 (26 Nov 2019 09:09)  T(F): 98.3 (27 Nov 2019 07:30), Max: 99.1 (26 Nov 2019 09:09)  HR: 65 (27 Nov 2019 07:30) (55 - 83)  BP: 158/94 (27 Nov 2019 07:30) (117/74 - 158/94)  BP(mean): --  RR: 18 (27 Nov 2019 07:30) (18 - 18)  SpO2: 93% (27 Nov 2019 07:30) (93% - 95%)    11-26 @ 07:01  -  11-27 @ 07:00  --------------------------------------------------------  IN: 550 mL / OUT: 0 mL / NET: 550 mL        PHYSICAL EXAM:    General: Well developed; well nourished; in no acute distress  HEENT: MMM, conjunctiva pink and sclera anicteric.  Lungs: clear to auscultation bilaterally.  Cor: RRR S1, S2 only.  Gastrointestinal: Abdomen: Soft mild upper abdominal tenderness with LLQ colostomy non-distended; Normal bowel sounds; No hepatosplenomegaly.  Extremities: no cyanosis, clubbing or edema.  Skin: Warm and dry. No obvious rash  Neuro: Pt. a + o x 3    LABS:  CBC Full  -  ( 27 Nov 2019 05:55 )  WBC Count : 7.36 K/uL  RBC Count : 3.88 M/uL  Hemoglobin : 11.2 g/dL  Hematocrit : 35.2 %  Platelet Count - Automated : 62 K/uL  Mean Cell Volume : 90.7 fl  Mean Cell Hemoglobin : 28.9 pg  Mean Cell Hemoglobin Concentration : 31.8 gm/dL  Auto Neutrophil # : x  Auto Lymphocyte # : x  Auto Monocyte # : x  Auto Eosinophil # : x  Auto Basophil # : x  Auto Neutrophil % : x  Auto Lymphocyte % : x  Auto Monocyte % : x  Auto Eosinophil % : x  Auto Basophil % : x    11-27    138  |  106  |  17.0  ----------------------------<  84  3.9   |  22.0  |  0.45<L>    Ca    8.4<L>      27 Nov 2019 05:55    TPro  5.2<L>  /  Alb  2.8<L>  /  TBili  1.1  /  DBili  0.6<H>  /  AST  84<H>  /  ALT  93<H>  /  AlkPhos  136<H>  11-27                      RADIOLOGY & ADDITIONAL STUDIES (The following images were personally reviewed):

## 2019-11-27 NOTE — CHART NOTE - NSCHARTNOTEFT_GEN_A_CORE
Pt had episode of tachycardia with . Pt without complaints. Pt states she is very anxious. Pt states her heart often "races" when she is anxious. Pt denies CP, SOB, arrhythmias or any complaints at this time.  VSS B/P 142/82 p 133 r 18 po 98% t afebrile  General A+OX3 cooperative NAD breathing easy and unlabored  Heart Tachy regular  Lungs Clear  Abd +BS soft,nontender, colostomy  ext neg calf tenderness  EKG tachycardic rate 132    Pts HR now 80, pt has not received atenolol X 2 days  Ativan 0.25mg po now x 1  Atenolol 25mg po now x1  Normal saline bolus 500cc  Troponins x 3  Thyroid profile  Pt VSS NAD no complaints,  continue to monitor  call PA if any changes in pts condition

## 2019-11-27 NOTE — PROGRESS NOTE ADULT - SUBJECTIVE AND OBJECTIVE BOX
IVY GARRISON Female 84y [id number]    Patient is a 84y old  Female who presents with a chief complaint of Choledocholithiasis (27 Nov 2019 07:43)      Subjective/objective:  Pt seen and examined at bedside, no over night event reported by night staff. Pt reports feeling nauseous and not eating well due to nausea, also reports no BM since last Sunday, has not emptied ostomy bag. No other complaints.    Review of system:  No fever, chills, nausea, vomiting, headache, dizziness, chest pain.        PHYSICAL EXAM:    Vital Signs Last 24 Hrs  T(C): 36.8 (27 Nov 2019 10:52), Max: 36.8 (27 Nov 2019 07:30)  T(F): 98.2 (27 Nov 2019 10:52), Max: 98.3 (27 Nov 2019 07:30)  HR: 58 (27 Nov 2019 10:52) (55 - 83)  BP: 135/78 (27 Nov 2019 10:52) (117/74 - 158/94)  BP(mean): --  RR: 18 (27 Nov 2019 10:52) (18 - 18)  SpO2: 95% (27 Nov 2019 10:52) (93% - 95%)    GENERAL: Pt lying comfortably, NAD. Old age lady.  NECK: soft, Supple, No JVD, Central line in place.   CHEST/LUNG: Clear to auscultation bilaterally; No wheezing.  HEART: S1S2+, Regular rate and rhythm; No murmurs.  ABDOMEN: Soft, Nontender, Nondistended; Bowel sounds present.  Extremities: No LE edema, pulses+  NEURO: AAOX3, no focal deficits, no motor r sensory loss.  PSYCH: normal mood.    MEDICATIONS  (STANDING):  ATENolol  Tablet 25 milliGRAM(s) Oral <User Schedule>  docusate sodium Oral Tab/Cap - Peds 100 milliGRAM(s) Oral two times a day  enoxaparin Injectable 40 milliGRAM(s) SubCutaneous daily  escitalopram 20 milliGRAM(s) Oral daily  levothyroxine 88 MICROGram(s) Oral daily  oxyCODONE  ER Tablet 20 milliGRAM(s) Oral every 12 hours  piperacillin/tazobactam IVPB.. 3.375 Gram(s) IV Intermittent every 8 hours  polyethylene glycol 3350 17 Gram(s) Oral daily  senna 2 Tablet(s) Oral at bedtime  sodium chloride 0.9%. 1000 milliLiter(s) (75 mL/Hr) IV Continuous <Continuous>    MEDICATIONS  (PRN):  acetaminophen   Tablet .. 650 milliGRAM(s) Oral every 6 hours PRN Mild Pain (1 - 3)  ALPRAZolam 0.25 milliGRAM(s) Oral daily PRN Anxiety  morphine  - Injectable 2 milliGRAM(s) IV Push every 6 hours PRN Moderate Pain (4 - 6)  morphine  - Injectable 4 milliGRAM(s) IV Push every 6 hours PRN Severe Pain (7 - 10)  ondansetron Injectable 4 milliGRAM(s) IV Push every 6 hours PRN Nausea and/or Vomiting  zolpidem 5 milliGRAM(s) Oral at bedtime PRN Insomnia        Labs:  LABS:                        11.2   7.36  )-----------( 62       ( 27 Nov 2019 05:55 )             35.2     11-27    138  |  106  |  17.0  ----------------------------<  84  3.9   |  22.0  |  0.45<L>    Ca    8.4<L>      27 Nov 2019 05:55    TPro  5.2<L>  /  Alb  2.8<L>  /  TBili  1.1  /  DBili  0.6<H>  /  AST  84<H>  /  ALT  93<H>  /  AlkPhos  136<H>  11-27        LIVER FUNCTIONS - ( 27 Nov 2019 05:55 )  Alb: 2.8 g/dL / Pro: 5.2 g/dL / ALK PHOS: 136 U/L / ALT: 93 U/L / AST: 84 U/L / GGT: x

## 2019-11-28 LAB
ALBUMIN SERPL ELPH-MCNC: 2.6 G/DL — LOW (ref 3.3–5.2)
ALP SERPL-CCNC: 146 U/L — HIGH (ref 40–120)
ALT FLD-CCNC: 57 U/L — HIGH
ANION GAP SERPL CALC-SCNC: 13 MMOL/L — SIGNIFICANT CHANGE UP (ref 5–17)
AST SERPL-CCNC: 35 U/L — HIGH
BASOPHILS # BLD AUTO: 0.04 K/UL — SIGNIFICANT CHANGE UP (ref 0–0.2)
BASOPHILS NFR BLD AUTO: 0.5 % — SIGNIFICANT CHANGE UP (ref 0–2)
BILIRUB SERPL-MCNC: 1 MG/DL — SIGNIFICANT CHANGE UP (ref 0.4–2)
BUN SERPL-MCNC: 11 MG/DL — SIGNIFICANT CHANGE UP (ref 8–20)
CALCIUM SERPL-MCNC: 8.4 MG/DL — LOW (ref 8.6–10.2)
CHLORIDE SERPL-SCNC: 104 MMOL/L — SIGNIFICANT CHANGE UP (ref 98–107)
CO2 SERPL-SCNC: 21 MMOL/L — LOW (ref 22–29)
CREAT SERPL-MCNC: 0.44 MG/DL — LOW (ref 0.5–1.3)
EOSINOPHIL # BLD AUTO: 0.07 K/UL — SIGNIFICANT CHANGE UP (ref 0–0.5)
EOSINOPHIL NFR BLD AUTO: 0.9 % — SIGNIFICANT CHANGE UP (ref 0–6)
GLUCOSE SERPL-MCNC: 91 MG/DL — SIGNIFICANT CHANGE UP (ref 70–115)
HCT VFR BLD CALC: 38 % — SIGNIFICANT CHANGE UP (ref 34.5–45)
HGB BLD-MCNC: 12.4 G/DL — SIGNIFICANT CHANGE UP (ref 11.5–15.5)
IMM GRANULOCYTES NFR BLD AUTO: 0.4 % — SIGNIFICANT CHANGE UP (ref 0–1.5)
INR BLD: 1 RATIO — SIGNIFICANT CHANGE UP (ref 0.88–1.16)
LYMPHOCYTES # BLD AUTO: 1.12 K/UL — SIGNIFICANT CHANGE UP (ref 1–3.3)
LYMPHOCYTES # BLD AUTO: 13.9 % — SIGNIFICANT CHANGE UP (ref 13–44)
MCHC RBC-ENTMCNC: 29.1 PG — SIGNIFICANT CHANGE UP (ref 27–34)
MCHC RBC-ENTMCNC: 32.6 GM/DL — SIGNIFICANT CHANGE UP (ref 32–36)
MCV RBC AUTO: 89.2 FL — SIGNIFICANT CHANGE UP (ref 80–100)
MONOCYTES # BLD AUTO: 0.61 K/UL — SIGNIFICANT CHANGE UP (ref 0–0.9)
MONOCYTES NFR BLD AUTO: 7.5 % — SIGNIFICANT CHANGE UP (ref 2–14)
NEUTROPHILS # BLD AUTO: 6.21 K/UL — SIGNIFICANT CHANGE UP (ref 1.8–7.4)
NEUTROPHILS NFR BLD AUTO: 76.8 % — SIGNIFICANT CHANGE UP (ref 43–77)
PLATELET # BLD AUTO: 70 K/UL — LOW (ref 150–400)
POTASSIUM SERPL-MCNC: 3.7 MMOL/L — SIGNIFICANT CHANGE UP (ref 3.5–5.3)
POTASSIUM SERPL-SCNC: 3.7 MMOL/L — SIGNIFICANT CHANGE UP (ref 3.5–5.3)
PROT SERPL-MCNC: 5.4 G/DL — LOW (ref 6.6–8.7)
PROTHROM AB SERPL-ACNC: 11.5 SEC — SIGNIFICANT CHANGE UP (ref 10–12.9)
RBC # BLD: 4.26 M/UL — SIGNIFICANT CHANGE UP (ref 3.8–5.2)
RBC # FLD: 14.6 % — HIGH (ref 10.3–14.5)
SODIUM SERPL-SCNC: 138 MMOL/L — SIGNIFICANT CHANGE UP (ref 135–145)
T3 SERPL-MCNC: 84 NG/DL — SIGNIFICANT CHANGE UP (ref 80–200)
T4 AB SER-ACNC: 8.2 UG/DL — SIGNIFICANT CHANGE UP (ref 4.5–12)
TROPONIN T SERPL-MCNC: 0.01 NG/ML — SIGNIFICANT CHANGE UP (ref 0–0.06)
TROPONIN T SERPL-MCNC: <0.01 NG/ML — SIGNIFICANT CHANGE UP (ref 0–0.06)
TROPONIN T SERPL-MCNC: <0.01 NG/ML — SIGNIFICANT CHANGE UP (ref 0–0.06)
TSH SERPL-MCNC: 7.32 UIU/ML — HIGH (ref 0.27–4.2)
WBC # BLD: 8.08 K/UL — SIGNIFICANT CHANGE UP (ref 3.8–10.5)
WBC # FLD AUTO: 8.08 K/UL — SIGNIFICANT CHANGE UP (ref 3.8–10.5)

## 2019-11-28 PROCEDURE — 99233 SBSQ HOSP IP/OBS HIGH 50: CPT

## 2019-11-28 PROCEDURE — 99232 SBSQ HOSP IP/OBS MODERATE 35: CPT

## 2019-11-28 RX ORDER — PANTOPRAZOLE SODIUM 20 MG/1
40 TABLET, DELAYED RELEASE ORAL
Refills: 0 | Status: DISCONTINUED | OUTPATIENT
Start: 2019-11-28 | End: 2019-11-29

## 2019-11-28 RX ORDER — DRONABINOL 2.5 MG
2.5 CAPSULE ORAL
Refills: 0 | Status: DISCONTINUED | OUTPATIENT
Start: 2019-11-28 | End: 2019-11-29

## 2019-11-28 RX ORDER — LACTULOSE 10 G/15ML
10 SOLUTION ORAL EVERY 12 HOURS
Refills: 0 | Status: DISCONTINUED | OUTPATIENT
Start: 2019-11-28 | End: 2019-11-29

## 2019-11-28 RX ADMIN — OXYCODONE HYDROCHLORIDE 20 MILLIGRAM(S): 5 TABLET ORAL at 18:10

## 2019-11-28 RX ADMIN — Medication 250 MILLIGRAM(S): at 05:14

## 2019-11-28 RX ADMIN — OXYCODONE HYDROCHLORIDE 20 MILLIGRAM(S): 5 TABLET ORAL at 05:14

## 2019-11-28 RX ADMIN — Medication 100 MILLIGRAM(S): at 05:14

## 2019-11-28 RX ADMIN — ENOXAPARIN SODIUM 40 MILLIGRAM(S): 100 INJECTION SUBCUTANEOUS at 12:05

## 2019-11-28 RX ADMIN — MORPHINE SULFATE 4 MILLIGRAM(S): 50 CAPSULE, EXTENDED RELEASE ORAL at 15:30

## 2019-11-28 RX ADMIN — OXYCODONE HYDROCHLORIDE 20 MILLIGRAM(S): 5 TABLET ORAL at 06:00

## 2019-11-28 RX ADMIN — Medication 100 MILLIGRAM(S): at 17:54

## 2019-11-28 RX ADMIN — PANTOPRAZOLE SODIUM 40 MILLIGRAM(S): 20 TABLET, DELAYED RELEASE ORAL at 12:05

## 2019-11-28 RX ADMIN — Medication 250 MILLIGRAM(S): at 17:14

## 2019-11-28 RX ADMIN — ESCITALOPRAM OXALATE 20 MILLIGRAM(S): 10 TABLET, FILM COATED ORAL at 12:05

## 2019-11-28 RX ADMIN — CEFTRIAXONE 100 MILLIGRAM(S): 500 INJECTION, POWDER, FOR SOLUTION INTRAMUSCULAR; INTRAVENOUS at 14:37

## 2019-11-28 RX ADMIN — OXYCODONE HYDROCHLORIDE 20 MILLIGRAM(S): 5 TABLET ORAL at 17:14

## 2019-11-28 RX ADMIN — LACTULOSE 10 GRAM(S): 10 SOLUTION ORAL at 12:05

## 2019-11-28 RX ADMIN — SENNA PLUS 2 TABLET(S): 8.6 TABLET ORAL at 21:38

## 2019-11-28 RX ADMIN — Medication 0.25 MILLIGRAM(S): at 12:05

## 2019-11-28 RX ADMIN — Medication 88 MICROGRAM(S): at 05:14

## 2019-11-28 RX ADMIN — MORPHINE SULFATE 4 MILLIGRAM(S): 50 CAPSULE, EXTENDED RELEASE ORAL at 15:45

## 2019-11-28 RX ADMIN — Medication 2.5 MILLIGRAM(S): at 17:14

## 2019-11-28 RX ADMIN — LACTULOSE 10 GRAM(S): 10 SOLUTION ORAL at 17:54

## 2019-11-28 NOTE — PROGRESS NOTE ADULT - SUBJECTIVE AND OBJECTIVE BOX
IVY GARRISON Female 84y [id number]    Patient is a 84y old  Female who presents with a chief complaint of Choledocholithiasis (28 Nov 2019 10:22)      Subjective/objective:  Pt seen and examined at bedside, no over night event reported by night staff. Pt has no abdomen pain/ nausea or vomiting. She states poor appetite. Pt still feeling constipated- has not changed ostomy bag since Sunday.     Review of system:  No fever, chills, nausea, vomiting, headache, dizziness, chest pain.    PHYSICAL EXAM:    Vital Signs Last 24 Hrs  T(C): 36.3 (28 Nov 2019 07:50), Max: 36.8 (27 Nov 2019 15:46)  T(F): 97.3 (28 Nov 2019 07:50), Max: 98.2 (27 Nov 2019 15:46)  HR: 56 (28 Nov 2019 07:50) (53 - 133)  BP: 139/73 (28 Nov 2019 07:50) (122/78 - 151/81)  BP(mean): --  RR: 20 (28 Nov 2019 07:50) (18 - 20)  SpO2: 96% (28 Nov 2019 04:27) (95% - 99%)    GENERAL: Pt lying comfortably, NAD. Old age lady.  NECK: soft, Supple, No JVD, Central line in place.   CHEST/LUNG: Clear to auscultation bilaterally; No wheezing.  HEART: S1S2+, Regular rate and rhythm; No murmurs.  ABDOMEN: Soft, Nontender, Nondistended; Bowel sounds present.  Extremities: No LE edema, pulses+  NEURO: AAOX3, no focal deficits, no motor r sensory loss.  PSYCH: normal mood.    MEDICATIONS  (STANDING):  ATENolol  Tablet 25 milliGRAM(s) Oral <User Schedule>  cefTRIAXone   IVPB 2000 milliGRAM(s) IV Intermittent every 24 hours  docusate sodium Oral Tab/Cap - Peds 100 milliGRAM(s) Oral two times a day  enoxaparin Injectable 40 milliGRAM(s) SubCutaneous daily  escitalopram 20 milliGRAM(s) Oral daily  levothyroxine 88 MICROGram(s) Oral daily  oxyCODONE  ER Tablet 20 milliGRAM(s) Oral every 12 hours  pantoprazole    Tablet 40 milliGRAM(s) Oral before breakfast  polyethylene glycol 3350 17 Gram(s) Oral daily  saccharomyces boulardii 250 milliGRAM(s) Oral two times a day  senna 2 Tablet(s) Oral at bedtime  sodium chloride 0.9%. 1000 milliLiter(s) (75 mL/Hr) IV Continuous <Continuous>    MEDICATIONS  (PRN):  acetaminophen   Tablet .. 650 milliGRAM(s) Oral every 6 hours PRN Mild Pain (1 - 3)  ALPRAZolam 0.25 milliGRAM(s) Oral daily PRN Anxiety  aluminum hydroxide/magnesium hydroxide/simethicone Suspension 30 milliLiter(s) Oral every 4 hours PRN Dyspepsia  morphine  - Injectable 2 milliGRAM(s) IV Push every 6 hours PRN Moderate Pain (4 - 6)  morphine  - Injectable 4 milliGRAM(s) IV Push every 6 hours PRN Severe Pain (7 - 10)  ondansetron Injectable 4 milliGRAM(s) IV Push every 6 hours PRN Nausea and/or Vomiting  zolpidem 5 milliGRAM(s) Oral at bedtime PRN Insomnia        Labs:  LABS:                        12.4   8.08  )-----------( 70       ( 28 Nov 2019 07:49 )             38.0     11-28    138  |  104  |  11.0  ----------------------------<  91  3.7   |  21.0<L>  |  0.44<L>    Ca    8.4<L>      28 Nov 2019 07:49    TPro  5.4<L>  /  Alb  2.6<L>  /  TBili  1.0  /  DBili  x   /  AST  35<H>  /  ALT  57<H>  /  AlkPhos  146<H>  11-28    PT/INR - ( 28 Nov 2019 07:49 )   PT: 11.5 sec;   INR: 1.00 ratio             LIVER FUNCTIONS - ( 28 Nov 2019 07:49 )  Alb: 2.6 g/dL / Pro: 5.4 g/dL / ALK PHOS: 146 U/L / ALT: 57 U/L / AST: 35 U/L / GGT: x               CARDIAC MARKERS ( 28 Nov 2019 07:49 )  x     / <0.01 ng/mL / x     / x     / x      CARDIAC MARKERS ( 27 Nov 2019 23:33 )  x     / <0.01 ng/mL / x     / x     / x

## 2019-11-28 NOTE — PROGRESS NOTE ADULT - SUBJECTIVE AND OBJECTIVE BOX
[facility]  INFECTIOUS DISEASES AND INTERNAL MEDICINE at [place]  =======================================================  Librado Golden MD  Diplomates American Board of Internal Medicine and Infectious Diseases  =======================================================    IVY GARRISON [id number]    Follow up: Bacteremia and cholangitis     Feels better, no abdominal pain. No fever, no diarrhea.   Feels anxious asking for xanax.     Allergies:  No Known Allergies    Antibiotics:  zosyn     REVIEW OF SYSTEMS:  CONSTITUTIONAL:  No Fever or chills  HEENT:   No diplopia or blurred vision.  No earache, sore throat or runny nose.  CARDIOVASCULAR:  No chest pain or SOB  RESPIRATORY:  No cough, shortness of breath, PND or orthopnea.  GASTROINTESTINAL:  No nausea, vomiting or diarrhea.  GENITOURINARY:  No dysuria, frequency or urgency. No Blood in urine  MUSCULOSKELETAL:  no joint aches, no muscle pain  SKIN:  No change in skin, hair or nails.  NEUROLOGIC:  No paresthesias or weakness.  PSYCHIATRIC:  No disorder of thought or mood.  ENDOCRINE:  No heat or cold intolerance, polyuria or polydipsia.  HEMATOLOGICAL:  No easy bruising or bleeding.      Physical Exam:  Vital Signs Last 24 Hrs  T(C): 36.3 (28 Nov 2019 07:50), Max: 36.8 (27 Nov 2019 10:52)  T(F): 97.3 (28 Nov 2019 07:50), Max: 98.2 (27 Nov 2019 10:52)  HR: 56 (28 Nov 2019 07:50) (53 - 133)  BP: 139/73 (28 Nov 2019 07:50) (122/78 - 151/81)  RR: 20 (28 Nov 2019 07:50) (18 - 20)  SpO2: 96% (28 Nov 2019 04:27) (95% - 99%)  GEN: NAD  HEENT: normocephalic and atraumatic. EOMI. DOMINIK.    NECK: Supple.  No lymphadenopathy   LUNGS: Clear to auscultation.  HEART: Regular rate and rhythm   ABDOMEN: Soft, nontender, and nondistended.  Positive bowel sounds. ostomy bag with some solid stool    : No CVA tenderness  EXTREMITIES: Without edema.  MSK: no joint swelling  NEUROLOGIC: grossly intact.  PSYCHIATRIC: Appropriate affect .  SKIN: No ulceration or induration present.      Labs:  11-28    138  |  104  |  11.0  ----------------------------<  91  3.7   |  21.0<L>  |  0.44<L>    Ca    8.4<L>      28 Nov 2019 07:49    TPro  5.4<L>  /  Alb  2.6<L>  /  TBili  1.0  /  DBili  x   /  AST  35<H>  /  ALT  57<H>  /  AlkPhos  146<H>  11-28                        12.4   8.08  )-----------( 70       ( 28 Nov 2019 07:49 )             38.0     PT/INR - ( 28 Nov 2019 07:49 )   PT: 11.5 sec;   INR: 1.00 ratio      LIVER FUNCTIONS - ( 28 Nov 2019 07:49 )  Alb: 2.6 g/dL / Pro: 5.4 g/dL / ALK PHOS: 146 U/L / ALT: 57 U/L / AST: 35 U/L / GGT: x           CARDIAC MARKERS ( 28 Nov 2019 07:49 )  x     / <0.01 ng/mL / x     / x     / x      CARDIAC MARKERS ( 27 Nov 2019 23:33 )  x     / <0.01 ng/mL / x     / x     / x        RECENT CULTURES:  11-25 @ 06:33 .Blood     No growth at 48 hours    11-25 @ 06:32 .Blood Escherichia coli  Blood Culture PCR    Growth in aerobic bottle: Escherichia coli  Aerobic Bottle: 14.35 Hours to positivity  Anaerobic Bottle: No growth to date    All imaging and data are reviewed.     Assessment and plan:   84yoF hx HTN, AAA, depression/anxiety, perforated viscus s/p sigmoid colectomy with ostomy (2014), s/p cholecystectomy, legally blind from macular degeneration, chronic pain syndrome from diffuse OA and spinal stenosis, sent from [facility] ([facility] [place] for common bile duct stones and need for ERCP. Pt presented to [facility] with a 1 day history of abdominal pain located in her upper quadrants associated with nausea, vomiting with non-bloody, non-bilious emesis and decreased ostomy output.  She then developed chest pain radiating to back at OSH, had CT angio chest/abd/pelvis which showed multiple stones in CBD including a 1cm stone distally with intrahepatic ductal dilatation. S/P ERCP and stent.     Cholangitis CBD stone   Bacteremia with Ecoli    - Blood culture with Ecoli on 11/25, S to ceftriaxone  - Repeat blood culture pending  - S/P ERCP and stent   - Continue ceftriaxone 2gm daily  - 2weeks treatment with ceftriaxone from first neg blood culture     Will follow.

## 2019-11-28 NOTE — PROGRESS NOTE ADULT - SUBJECTIVE AND OBJECTIVE BOX
INTERVAL HPI/OVERNIGHT EVENTS:FU for choledocholithiasis and bacteremia. Still has poor appetite. Colostomy bag almost empty. No output. No abdominal pain. On antibiotics as per ID. LFTs are better. No fever. No other complaints.    MEDICATIONS  (STANDING):  ATENolol  Tablet 25 milliGRAM(s) Oral <User Schedule>  cefTRIAXone   IVPB 2000 milliGRAM(s) IV Intermittent every 24 hours  docusate sodium Oral Tab/Cap - Peds 100 milliGRAM(s) Oral two times a day  dronabinol 2.5 milliGRAM(s) Oral two times a day  enoxaparin Injectable 40 milliGRAM(s) SubCutaneous daily  escitalopram 20 milliGRAM(s) Oral daily  lactulose Syrup 10 Gram(s) Oral every 12 hours  levothyroxine 88 MICROGram(s) Oral daily  oxyCODONE  ER Tablet 20 milliGRAM(s) Oral every 12 hours  pantoprazole    Tablet 40 milliGRAM(s) Oral before breakfast  polyethylene glycol 3350 17 Gram(s) Oral daily  saccharomyces boulardii 250 milliGRAM(s) Oral two times a day  senna 2 Tablet(s) Oral at bedtime  sodium chloride 0.9%. 1000 milliLiter(s) (75 mL/Hr) IV Continuous <Continuous>    MEDICATIONS  (PRN):  acetaminophen   Tablet .. 650 milliGRAM(s) Oral every 6 hours PRN Mild Pain (1 - 3)  ALPRAZolam 0.25 milliGRAM(s) Oral daily PRN Anxiety  aluminum hydroxide/magnesium hydroxide/simethicone Suspension 30 milliLiter(s) Oral every 4 hours PRN Dyspepsia  morphine  - Injectable 2 milliGRAM(s) IV Push every 6 hours PRN Moderate Pain (4 - 6)  morphine  - Injectable 4 milliGRAM(s) IV Push every 6 hours PRN Severe Pain (7 - 10)  ondansetron Injectable 4 milliGRAM(s) IV Push every 6 hours PRN Nausea and/or Vomiting  zolpidem 5 milliGRAM(s) Oral at bedtime PRN Insomnia      Allergies    No Known Allergies    Intolerances        Vital Signs Last 24 Hrs  T(C): 36.3 (28 Nov 2019 07:50), Max: 36.8 (27 Nov 2019 15:46)  T(F): 97.3 (28 Nov 2019 07:50), Max: 98.2 (27 Nov 2019 15:46)  HR: 56 (28 Nov 2019 07:50) (53 - 133)  BP: 139/73 (28 Nov 2019 07:50) (122/78 - 151/81)  BP(mean): --  RR: 20 (28 Nov 2019 07:50) (18 - 20)  SpO2: 97% (28 Nov 2019 07:50) (95% - 99%)    LABS:                        12.4   8.08  )-----------( 70       ( 28 Nov 2019 07:49 )             38.0     11-28    138  |  104  |  11.0  ----------------------------<  91  3.7   |  21.0<L>  |  0.44<L>    Ca    8.4<L>      28 Nov 2019 07:49    TPro  5.4<L>  /  Alb  2.6<L>  /  TBili  1.0  /  DBili  x   /  AST  35<H>  /  ALT  57<H>  /  AlkPhos  146<H>  11-28    PT/INR - ( 28 Nov 2019 07:49 )   PT: 11.5 sec;   INR: 1.00 ratio               RADIOLOGY & ADDITIONAL TESTS:

## 2019-11-29 ENCOUNTER — TRANSCRIPTION ENCOUNTER (OUTPATIENT)
Age: 84
End: 2019-11-29

## 2019-11-29 VITALS
SYSTOLIC BLOOD PRESSURE: 143 MMHG | HEART RATE: 54 BPM | RESPIRATION RATE: 18 BRPM | TEMPERATURE: 99 F | DIASTOLIC BLOOD PRESSURE: 84 MMHG

## 2019-11-29 DIAGNOSIS — R78.81 BACTEREMIA: ICD-10-CM

## 2019-11-29 PROCEDURE — 85027 COMPLETE CBC AUTOMATED: CPT

## 2019-11-29 PROCEDURE — 85730 THROMBOPLASTIN TIME PARTIAL: CPT

## 2019-11-29 PROCEDURE — 84443 ASSAY THYROID STIM HORMONE: CPT

## 2019-11-29 PROCEDURE — C1769: CPT

## 2019-11-29 PROCEDURE — 84484 ASSAY OF TROPONIN QUANT: CPT

## 2019-11-29 PROCEDURE — 99239 HOSP IP/OBS DSCHRG MGMT >30: CPT

## 2019-11-29 PROCEDURE — 87040 BLOOD CULTURE FOR BACTERIA: CPT

## 2019-11-29 PROCEDURE — 84145 PROCALCITONIN (PCT): CPT

## 2019-11-29 PROCEDURE — 80053 COMPREHEN METABOLIC PANEL: CPT

## 2019-11-29 PROCEDURE — 87150 DNA/RNA AMPLIFIED PROBE: CPT

## 2019-11-29 PROCEDURE — 97163 PT EVAL HIGH COMPLEX 45 MIN: CPT

## 2019-11-29 PROCEDURE — 84480 ASSAY TRIIODOTHYRONINE (T3): CPT

## 2019-11-29 PROCEDURE — 83605 ASSAY OF LACTIC ACID: CPT

## 2019-11-29 PROCEDURE — 93005 ELECTROCARDIOGRAM TRACING: CPT

## 2019-11-29 PROCEDURE — 36415 COLL VENOUS BLD VENIPUNCTURE: CPT

## 2019-11-29 PROCEDURE — 86900 BLOOD TYPING SEROLOGIC ABO: CPT

## 2019-11-29 PROCEDURE — 86901 BLOOD TYPING SEROLOGIC RH(D): CPT

## 2019-11-29 PROCEDURE — 80076 HEPATIC FUNCTION PANEL: CPT

## 2019-11-29 PROCEDURE — 80048 BASIC METABOLIC PNL TOTAL CA: CPT

## 2019-11-29 PROCEDURE — 99232 SBSQ HOSP IP/OBS MODERATE 35: CPT

## 2019-11-29 PROCEDURE — 86850 RBC ANTIBODY SCREEN: CPT

## 2019-11-29 PROCEDURE — 85610 PROTHROMBIN TIME: CPT

## 2019-11-29 PROCEDURE — 99285 EMERGENCY DEPT VISIT HI MDM: CPT

## 2019-11-29 PROCEDURE — 74330 X-RAY BILE/PANC ENDOSCOPY: CPT

## 2019-11-29 PROCEDURE — C1874: CPT

## 2019-11-29 PROCEDURE — 84436 ASSAY OF TOTAL THYROXINE: CPT

## 2019-11-29 PROCEDURE — C1773: CPT

## 2019-11-29 PROCEDURE — 87186 SC STD MICRODIL/AGAR DIL: CPT

## 2019-11-29 RX ORDER — DOCUSATE SODIUM 100 MG
1 CAPSULE ORAL
Qty: 20 | Refills: 0
Start: 2019-11-29

## 2019-11-29 RX ORDER — AZTREONAM 2 G
1 VIAL (EA) INJECTION
Qty: 20 | Refills: 0
Start: 2019-11-29

## 2019-11-29 RX ORDER — SENNA PLUS 8.6 MG/1
2 TABLET ORAL
Qty: 60 | Refills: 0
Start: 2019-11-29 | End: 2019-12-28

## 2019-11-29 RX ORDER — ACETAMINOPHEN 500 MG
2 TABLET ORAL
Qty: 0 | Refills: 0 | DISCHARGE
Start: 2019-11-29

## 2019-11-29 RX ORDER — PANTOPRAZOLE SODIUM 20 MG/1
1 TABLET, DELAYED RELEASE ORAL
Qty: 30 | Refills: 0
Start: 2019-11-29 | End: 2019-12-28

## 2019-11-29 RX ORDER — SACCHAROMYCES BOULARDII 250 MG
1 POWDER IN PACKET (EA) ORAL
Qty: 20 | Refills: 0
Start: 2019-11-29 | End: 2019-12-08

## 2019-11-29 RX ORDER — SULFAMETHOXAZOLE AND TRIMETHOPRIM 800; 160 MG/1; MG/1
800-160 TABLET ORAL TWICE DAILY
Qty: 20 | Refills: 0 | Status: ACTIVE | COMMUNITY
Start: 2019-11-29 | End: 1900-01-01

## 2019-11-29 RX ADMIN — OXYCODONE HYDROCHLORIDE 20 MILLIGRAM(S): 5 TABLET ORAL at 06:20

## 2019-11-29 RX ADMIN — PANTOPRAZOLE SODIUM 40 MILLIGRAM(S): 20 TABLET, DELAYED RELEASE ORAL at 06:22

## 2019-11-29 RX ADMIN — Medication 250 MILLIGRAM(S): at 06:22

## 2019-11-29 RX ADMIN — Medication 100 MILLIGRAM(S): at 06:22

## 2019-11-29 RX ADMIN — MORPHINE SULFATE 4 MILLIGRAM(S): 50 CAPSULE, EXTENDED RELEASE ORAL at 02:02

## 2019-11-29 RX ADMIN — CEFTRIAXONE 100 MILLIGRAM(S): 500 INJECTION, POWDER, FOR SOLUTION INTRAMUSCULAR; INTRAVENOUS at 11:29

## 2019-11-29 RX ADMIN — POLYETHYLENE GLYCOL 3350 17 GRAM(S): 17 POWDER, FOR SOLUTION ORAL at 10:20

## 2019-11-29 RX ADMIN — Medication 2.5 MILLIGRAM(S): at 06:21

## 2019-11-29 RX ADMIN — LACTULOSE 10 GRAM(S): 10 SOLUTION ORAL at 06:22

## 2019-11-29 RX ADMIN — ENOXAPARIN SODIUM 40 MILLIGRAM(S): 100 INJECTION SUBCUTANEOUS at 10:21

## 2019-11-29 RX ADMIN — ATENOLOL 25 MILLIGRAM(S): 25 TABLET ORAL at 06:21

## 2019-11-29 RX ADMIN — Medication 88 MICROGRAM(S): at 06:22

## 2019-11-29 RX ADMIN — OXYCODONE HYDROCHLORIDE 20 MILLIGRAM(S): 5 TABLET ORAL at 07:23

## 2019-11-29 RX ADMIN — MORPHINE SULFATE 4 MILLIGRAM(S): 50 CAPSULE, EXTENDED RELEASE ORAL at 03:15

## 2019-11-29 RX ADMIN — Medication 30 MILLILITER(S): at 10:20

## 2019-11-29 RX ADMIN — ESCITALOPRAM OXALATE 20 MILLIGRAM(S): 10 TABLET, FILM COATED ORAL at 10:21

## 2019-11-29 NOTE — PROGRESS NOTE ADULT - SUBJECTIVE AND OBJECTIVE BOX
[facility]  INFECTIOUS DISEASES AND INTERNAL MEDICINE at [place]  =======================================================  Librado Golden MD  Diplomates American Board of Internal Medicine and Infectious Diseases  =======================================================    IVY GARRISON [id number]    Follow up: Bacteremia and cholangitis     Feels better, no abdominal pain. No fever, no diarrhea.   Feels anxious asking for xanax.     Allergies:  No Known Allergies    Antibiotics:  zosyn     REVIEW OF SYSTEMS:  CONSTITUTIONAL:  No Fever or chills  HEENT:   No diplopia or blurred vision.  No earache, sore throat or runny nose.  CARDIOVASCULAR:  No chest pain or SOB  RESPIRATORY:  No cough, shortness of breath, PND or orthopnea.  GASTROINTESTINAL:  No nausea, vomiting or diarrhea.  GENITOURINARY:  No dysuria, frequency or urgency. No Blood in urine  MUSCULOSKELETAL:  no joint aches, no muscle pain  SKIN:  No change in skin, hair or nails.  NEUROLOGIC:  No paresthesias or weakness.  PSYCHIATRIC:  No disorder of thought or mood.  ENDOCRINE:  No heat or cold intolerance, polyuria or polydipsia.  HEMATOLOGICAL:  No easy bruising or bleeding.      Physical Exam:  Vital Signs Last 24 Hrs  T(C): 36.3 (28 Nov 2019 07:50), Max: 36.8 (27 Nov 2019 10:52)  T(F): 97.3 (28 Nov 2019 07:50), Max: 98.2 (27 Nov 2019 10:52)  HR: 56 (28 Nov 2019 07:50) (53 - 133)  BP: 139/73 (28 Nov 2019 07:50) (122/78 - 151/81)  RR: 20 (28 Nov 2019 07:50) (18 - 20)  SpO2: 96% (28 Nov 2019 04:27) (95% - 99%)  GEN: NAD  HEENT: normocephalic and atraumatic. EOMI. DOMINIK.    NECK: Supple.  No lymphadenopathy   LUNGS: Clear to auscultation.  HEART: Regular rate and rhythm   ABDOMEN: Soft, nontender, and nondistended.  Positive bowel sounds. ostomy bag with some solid stool    : No CVA tenderness  EXTREMITIES: Without edema.  MSK: no joint swelling  NEUROLOGIC: grossly intact.  PSYCHIATRIC: Appropriate affect .  SKIN: No ulceration or induration present.      Labs:  11-28    138  |  104  |  11.0  ----------------------------<  91  3.7   |  21.0<L>  |  0.44<L>    Ca    8.4<L>      28 Nov 2019 07:49    TPro  5.4<L>  /  Alb  2.6<L>  /  TBili  1.0  /  DBili  x   /  AST  35<H>  /  ALT  57<H>  /  AlkPhos  146<H>  11-28                        12.4   8.08  )-----------( 70       ( 28 Nov 2019 07:49 )             38.0     PT/INR - ( 28 Nov 2019 07:49 )   PT: 11.5 sec;   INR: 1.00 ratio      LIVER FUNCTIONS - ( 28 Nov 2019 07:49 )  Alb: 2.6 g/dL / Pro: 5.4 g/dL / ALK PHOS: 146 U/L / ALT: 57 U/L / AST: 35 U/L / GGT: x           CARDIAC MARKERS ( 28 Nov 2019 07:49 )  x     / <0.01 ng/mL / x     / x     / x      CARDIAC MARKERS ( 27 Nov 2019 23:33 )  x     / <0.01 ng/mL / x     / x     / x        RECENT CULTURES:  11-25 @ 06:33 .Blood     No growth at 48 hours    11-25 @ 06:32 .Blood Escherichia coli  Blood Culture PCR    Growth in aerobic bottle: Escherichia coli  Aerobic Bottle: 14.35 Hours to positivity  Anaerobic Bottle: No growth to date    All imaging and data are reviewed.     Assessment and plan:   84yoF hx HTN, AAA, depression/anxiety, perforated viscus s/p sigmoid colectomy with ostomy (2014), s/p cholecystectomy, legally blind from macular degeneration, chronic pain syndrome from diffuse OA and spinal stenosis, sent from [facility] [place] for common bile duct stones and need for ERCP. Pt presented to [facility] with a 1 day history of abdominal pain located in her upper quadrants associated with nausea, vomiting with non-bloody, non-bilious emesis and decreased ostomy output.  She then developed chest pain radiating to back at OSH, had CT angio chest/abd/pelvis which showed multiple stones in CBD including a 1cm stone distally with intrahepatic ductal dilatation. S/P ERCP and stent.     Cholangitis CBD stone   Bacteremia with Ecoli    - Blood culture with Ecoli on 11/25, S to ceftriaxone  - Repeat blood culture pending  - S/P ERCP and stent   - Continue ceftriaxone 2gm daily  - 2weeks treatment with ceftriaxone from first neg blood culture   - can switch to bactrim DS bid for 10more days (sent on cirpo and flagyl, contacted  [phone]    Will sign off please call with any question. [facility]  INFECTIOUS DISEASES AND INTERNAL MEDICINE at [place]  =======================================================  Librado Golden MD  Diplomates American Board of Internal Medicine and Infectious Diseases  =======================================================    IVY GARRISON [id number]    Follow up: Bacteremia and cholangitis     Feels better, no abdominal pain. No fever, no diarrhea.   Feels anxious asking for xanax.     Allergies:  No Known Allergies    Antibiotics:  zosyn     REVIEW OF SYSTEMS:  CONSTITUTIONAL:  No Fever or chills  HEENT:   No diplopia or blurred vision.  No earache, sore throat or runny nose.  CARDIOVASCULAR:  No chest pain or SOB  RESPIRATORY:  No cough, shortness of breath, PND or orthopnea.  GASTROINTESTINAL:  No nausea, vomiting or diarrhea.  GENITOURINARY:  No dysuria, frequency or urgency. No Blood in urine  MUSCULOSKELETAL:  no joint aches, no muscle pain  SKIN:  No change in skin, hair or nails.  NEUROLOGIC:  No paresthesias or weakness.  PSYCHIATRIC:  No disorder of thought or mood.  ENDOCRINE:  No heat or cold intolerance, polyuria or polydipsia.  HEMATOLOGICAL:  No easy bruising or bleeding.      Physical Exam:  Vital Signs Last 24 Hrs  T(C): 36.3 (28 Nov 2019 07:50), Max: 36.8 (27 Nov 2019 10:52)  T(F): 97.3 (28 Nov 2019 07:50), Max: 98.2 (27 Nov 2019 10:52)  HR: 56 (28 Nov 2019 07:50) (53 - 133)  BP: 139/73 (28 Nov 2019 07:50) (122/78 - 151/81)  RR: 20 (28 Nov 2019 07:50) (18 - 20)  SpO2: 96% (28 Nov 2019 04:27) (95% - 99%)  GEN: NAD  HEENT: normocephalic and atraumatic. EOMI. DOMINIK.    NECK: Supple.  No lymphadenopathy   LUNGS: Clear to auscultation.  HEART: Regular rate and rhythm   ABDOMEN: Soft, nontender, and nondistended.  Positive bowel sounds. ostomy bag with some solid stool    : No CVA tenderness  EXTREMITIES: Without edema.  MSK: no joint swelling  NEUROLOGIC: grossly intact.  PSYCHIATRIC: Appropriate affect .  SKIN: No ulceration or induration present.      Labs:  11-28    138  |  104  |  11.0  ----------------------------<  91  3.7   |  21.0<L>  |  0.44<L>    Ca    8.4<L>      28 Nov 2019 07:49    TPro  5.4<L>  /  Alb  2.6<L>  /  TBili  1.0  /  DBili  x   /  AST  35<H>  /  ALT  57<H>  /  AlkPhos  146<H>  11-28                        12.4   8.08  )-----------( 70       ( 28 Nov 2019 07:49 )             38.0     PT/INR - ( 28 Nov 2019 07:49 )   PT: 11.5 sec;   INR: 1.00 ratio      LIVER FUNCTIONS - ( 28 Nov 2019 07:49 )  Alb: 2.6 g/dL / Pro: 5.4 g/dL / ALK PHOS: 146 U/L / ALT: 57 U/L / AST: 35 U/L / GGT: x           CARDIAC MARKERS ( 28 Nov 2019 07:49 )  x     / <0.01 ng/mL / x     / x     / x      CARDIAC MARKERS ( 27 Nov 2019 23:33 )  x     / <0.01 ng/mL / x     / x     / x        RECENT CULTURES:  11-25 @ 06:33 .Blood     No growth at 48 hours    11-25 @ 06:32 .Blood Escherichia coli  Blood Culture PCR    Growth in aerobic bottle: Escherichia coli  Aerobic Bottle: 14.35 Hours to positivity  Anaerobic Bottle: No growth to date    All imaging and data are reviewed.     Assessment and plan:   84yoF hx HTN, AAA, depression/anxiety, perforated viscus s/p sigmoid colectomy with ostomy (2014), s/p cholecystectomy, legally blind from macular degeneration, chronic pain syndrome from diffuse OA and spinal stenosis, sent from [facility] ([facility] [place] for common bile duct stones and need for ERCP. Pt presented to [facility] with a 1 day history of abdominal pain located in her upper quadrants associated with nausea, vomiting with non-bloody, non-bilious emesis and decreased ostomy output.  She then developed chest pain radiating to back at OSH, had CT angio chest/abd/pelvis which showed multiple stones in CBD including a 1cm stone distally with intrahepatic ductal dilatation. S/P ERCP and stent.     Cholangitis CBD stone   Bacteremia with Ecoli    - Blood culture with Ecoli on 11/25, S to ceftriaxone  - Repeat blood culture pending  - S/P ERCP and stent   - Continue ceftriaxone 2gm daily  - 2weeks treatment with ceftriaxone from first neg blood culture   - can switch to bactrim DS bid for 10more days ( contacted  [phone] and infomred Ms. Mack. Medication was sent to [facility] in [facility])    Will sign off please call with any question.

## 2019-11-29 NOTE — DISCHARGE NOTE NURSING/CASE MANAGEMENT/SOCIAL WORK - NSDCPEEMAIL_GEN_ALL_CORE
[facility] email [email or web address]

## 2019-11-29 NOTE — DISCHARGE NOTE PROVIDER - NSDCCPCAREPLAN_GEN_ALL_CORE_FT
PRINCIPAL DISCHARGE DIAGNOSIS  Diagnosis: Choledocholithiasis  Assessment and Plan of Treatment: S/p ERCP, removal of stones and stent placement.   F/u with GI in clinic. Will need repeat ERC and stent removal as outpatient per GI      SECONDARY DISCHARGE DIAGNOSES  Diagnosis: Gram negative septicemia  Assessment and Plan of Treatment: Probable 2/2 to cholangitis.   Repeat blood cx negative.   C/w antibiotics as recocniled.    Diagnosis: Anxiety  Assessment and Plan of Treatment: C/w home meds  F/u with your PMD in 1 week    Diagnosis: Depression  Assessment and Plan of Treatment: C/w home meds  F/u with your PMD in 1 week    Diagnosis: Pain syndrome, chronic  Assessment and Plan of Treatment: C/w home meds  F/u with your PMD in 1 week    Diagnosis: HTN (hypertension)  Assessment and Plan of Treatment: C/w home meds  F/u with your PMD in 1 week

## 2019-11-29 NOTE — PROGRESS NOTE ADULT - ASSESSMENT
84yoF hx HTN, AAA, depression/anxiety, perforated viscus s/p sigmoid colectomy with ostomy (2014), legally blind from macular degeneration, chronic pain from diffuse OA and spinal stenosis, sent from [facility] for common bile duct stones and need for ERCP. Off note Has central line place at [facility] for poor peripheral access. Pt seen by GI here,  s/p ERCP, sphincterotomy, removal of at least three large stones and gravel and insertion of metallic stent. Blood cx grew GNR, Kept on Abx in consultation with ID.     >Choledocholithiasis/ suspect cholangitis:  - S/p ERCP with stent placement and removal of stone.  - C/w empiric Abx. Tylenol, probiotics  - Add zofran prn  - Monitor LFT  - Blood cx GNR, repeat blood cx in process  - GI following.   - ID on board.       >Bacteremia- plan as above.     >Hypokalemia- Resolved.     >Elevated LFTs- Due to CBD stones, improving, monitor.     >Constipation- Reduced output from ostomy per Pt, last emptied on Sunday, add bowel regimen.    >HTN- C/w Atenolol. Monitor BP.     >AAA  -No acute dissection on CTA, showed stable 5cm ascending aorta aneurysm and 5.4cm infrarenal abdominal aneurysm   -Outpatient surveillance    >Chronic pain syndrome  -From OA of neck, back and knees and spinal stenosis  -C/w home OxyContin 20mg ER    >Depression/anxiety- Escitalopram and Xanax PRN resumed    >Hypothyroidism-Levothyroxine resumed    >Prophylactic measure-Lovenox
84yoF hx HTN, AAA, depression/anxiety, perforated viscus s/p sigmoid colectomy with ostomy (2014), legally blind from macular degeneration, chronic pain from diffuse OA and spinal stenosis, sent from [facility] for common bile duct stones and need for ERCP. Off note Has central line place at [facility] for poor peripheral access. Pt seen by GI here,  s/p ERCP, sphincterotomy, removal of at least three large stones and gravel and insertion of metallic stent. Blood cx grew GNR, Kept on Abx in consultation with ID.     >Choledocholithiasis/ suspect cholangitis:  - S/p ERCP with stent placement and removal of stone.  - C/w empiric Abx Rociphen. Tylenol, probiotics, Zofran prn  - Monitor LFT  - Blood cx GNR, repeat blood cx in process  - GI following.   - ID on board.     >Bacteremia- plan as above. Blood cx from 11/26 in process.    >Hypokalemia- Resolved.     >Elevated LFTs- Due to CBD stones, improving, monitor.     >Constipation- Reduced output from ostomy per Pt, last emptied on Sunday, added bowel regimen- still no good output. Add lactulose.    >HTN- C/w Atenolol. Monitor BP.     >AAA  -No acute dissection on CTA, showed stable 5cm ascending aorta aneurysm and 5.4cm infrarenal abdominal aneurysm   -Outpatient surveillance    >Chronic pain syndrome  -From OA of neck, back and knees and spinal stenosis  -C/w home OxyContin 20mg ER    >Depression/anxiety- Escitalopram and Xanax PRN resumed    >Hypothyroidism-Levothyroxine resumed    >Prophylactic measure-Lovenox
84yoF hx HTN, AAA, depression/anxiety, perforated viscus s/p sigmoid colectomy with ostomy (2014), legally blind from macular degeneration, chronic pain from diffuse OA and spinal stenosis, [place] [facility] [place] and need for [place]CP. Off note [place] [facility] [place]. Pt seen by GI here,  s/p ERCP, sphincterotomy, removal of at least three large stones and gravel and insertion of metallic stent. Blood cx grew GNR, Kept on Abx in consultation with ID.     >Choledocholithiasis/ suspect cholangitis:  - S/p ERCP with stent placement and removal of stone.  - C/w empiric Abx.   - Monitor LFT  - Blood cx GNR, repeat blood cx.   - GI following.   - ID consult requested       >Bacteremia- plan as above.     >Hypokalemia- Resolved.     >Elevated LFTs- Due to CBD stones, improving, monitor.     >HTN- C/w Atenolol. Monitor BP.     >AAA  -No acute dissection on CTA, showed stable 5cm ascending aorta aneurysm and 5.4cm infrarenal abdominal aneurysm   -Outpatient surveillance    >Chronic pain syndrome  -From OA of neck, back and knees and spinal stenosis  -C/w home OxyContin 20mg ER    >Depression/anxiety- Escitalopram and Xanax PRN resumed    >Hypothyroidism-Levothyroxine resumed    >Prophylactic measure-Lovenox
Improved.  Being converted to oral antibiotics.  S/P ERCP ES and stone extractions and placement of biliary stent.  1 lg residual stone.  LFT's downtrending.  Nearly normal.  Not toxic.  No current need for inpatient treatment from a GI POV.  Will need office follow up in 1 month with Dr Palafox for scheduling of the follow up ERCP and completion of stone extraction.    Suggest JORDI 300 mg po tid upon discharge.
Patient with choledocholithiasis. s/p ERCP with bile duct stent placement. Improving. Continue antibiotics as per ID. Platelet count is stable. LFTs are improving. No additional GI input. If continues to improve, then FU with Dr Michael Palafox as outpatient for performing ERCP and stent pull.

## 2019-11-29 NOTE — DISCHARGE NOTE PROVIDER - PROVIDER TOKENS
FREE:[LAST:[PMD],PHONE:[(   )    -],FAX:[(   )    -]],PROVIDER:[TOKEN:[[id number]]],PROVIDER:[TOKEN:[[id number]]]

## 2019-11-29 NOTE — PHYSICAL THERAPY INITIAL EVALUATION ADULT - LEVEL OF INDEPENDENCE: SIT/STAND, REHAB EVAL
minimum assist (75% patients effort) minimum assist (75% patients effort)/with 1 sit to stand pt c/o dizziness, had to sit and rest for 1 few mins before attempting again

## 2019-11-29 NOTE — DISCHARGE NOTE PROVIDER - HOSPITAL COURSE
84yoF hx HTN, AAA, depression/anxiety, perforated viscus s/p sigmoid colectomy with ostomy (2014), legally blind from macular degeneration, chronic pain from diffuse OA and spinal stenosis, [place] [facility] [place] stones and need for ERCP. Off note Has central line place at [facility] for poor peripheral access. Pt seen by GI here,  s/p ERCP, sphincterotomy, removal of at least three large stones and gravel and insertion of metallic stent. Blood cx grew GNR, Kept on Abx in consultation with ID, repeat cx negative, ID followed up and Abx changed to PO Cipro/ Flagyl for total 14 days from day of procedure. Pt cleared by ID/ GI. She reamined afebrile, no abd pain/ nausea or vomiting. Seen by PT- [facility] recommended however Pt refused to go to [facility], will be going home with resumption of home service.         PHYSICAL EXAM:        Vital Signs Last 24 Hrs    T(C): 37.1 (29 Nov 2019 09:22), Max: 37.1 (29 Nov 2019 09:22)    T(F): 98.8 (29 Nov 2019 09:22), Max: 98.8 (29 Nov 2019 09:22)    HR: 54 (29 Nov 2019 09:22) (54 - 62)    BP: 143/84 (29 Nov 2019 09:22) (129/67 - 147/75)    BP(mean): --    RR: 18 (29 Nov 2019 09:22) (18 - 18)    SpO2: 95% (28 Nov 2019 20:51) (95% - 97%)        GENERAL: Pt lying comfortably, NAD.    ENMT: PERRL, +EOMI.    NECK: soft, Supple, No JVD,     CHEST/LUNG: Clear to auscultatation bilaterally; No wheezing.    HEART: S1S2+, Regular rate and rhythm; No murmurs.    ABDOMEN: Soft, Nontender, Nondistended; Bowel sounds present.    MUSCULOSKELETAL: Normal range of motion.    SKIN: No rashes or lesions.    NEURO: AAOX3, no focal deficits, no motor r sensory loss.    PSYCH: normal mood.

## 2019-11-29 NOTE — DISCHARGE NOTE PROVIDER - CARE PROVIDER_API CALL
DAMIEN,   Phone: (   )    -  Fax: (   )    -  Follow Up Time:     Michael Palafox)  Gastroenterology; Internal Medicine  [address] [facility] [facility], [address]  [place] [address]  Phone: [phone]  Fax: [phone]  Follow Up Time:     Izabella Castro)  Infectious Disease; Internal Medicine  [address], [address]  [place] [address]  Phone: [phone]  Fax: [phone]  Follow Up Time:

## 2019-11-29 NOTE — PROGRESS NOTE ADULT - SUBJECTIVE AND OBJECTIVE BOX
Patient seen and examined; chart reviewed.  Feels OK.  Doesn't like hospital food.  No fever or abdominal pain.  Had BM yesterday.  Blind and could not see the BM.  On antibiotics for Bacteremia with E Coli.  Still in bed x several days.  No Fever or SOB.      PAST MEDICAL & SURGICAL HISTORY:  Spinal stenosis  Osteoarthritis  Chronic back pain  Cataract  Macular degeneration  Colostomy present  Perforated abdominal viscus: spontaneous rupture as per patient  Hypothyroidism  Depression with anxiety  AAA (abdominal aortic aneurysm): ascending aortic and infrarenal aneurysms  Hypertension  S/P appendectomy: 2013  S/P cataract surgery: right eye  S/P cholecystectomy  S/P colostomy: 2014  H/O colectomy: sigmoid colectomy      ROS:  No Heartburn, regurgitation, dysphagia, odynophagia.  No dyspepsia  No abdominal pain.    No Nausea, vomiting.  No Bleeding.  No hematemesis.   No diarrhea.    No hematochesia.  No weight loss, anorexia.  No edema.      MEDICATIONS  (STANDING):  ATENolol  Tablet 25 milliGRAM(s) Oral <User Schedule>  cefTRIAXone   IVPB 2000 milliGRAM(s) IV Intermittent every 24 hours  docusate sodium Oral Tab/Cap - Peds 100 milliGRAM(s) Oral two times a day  dronabinol 2.5 milliGRAM(s) Oral two times a day  enoxaparin Injectable 40 milliGRAM(s) SubCutaneous daily  escitalopram 20 milliGRAM(s) Oral daily  lactulose Syrup 10 Gram(s) Oral every 12 hours  levothyroxine 88 MICROGram(s) Oral daily  oxyCODONE  ER Tablet 20 milliGRAM(s) Oral every 12 hours  pantoprazole    Tablet 40 milliGRAM(s) Oral before breakfast  polyethylene glycol 3350 17 Gram(s) Oral daily  saccharomyces boulardii 250 milliGRAM(s) Oral two times a day  senna 2 Tablet(s) Oral at bedtime  sodium chloride 0.9%. 1000 milliLiter(s) (75 mL/Hr) IV Continuous <Continuous>    MEDICATIONS  (PRN):  acetaminophen   Tablet .. 650 milliGRAM(s) Oral every 6 hours PRN Mild Pain (1 - 3)  ALPRAZolam 0.25 milliGRAM(s) Oral daily PRN Anxiety  aluminum hydroxide/magnesium hydroxide/simethicone Suspension 30 milliLiter(s) Oral every 4 hours PRN Dyspepsia  morphine  - Injectable 2 milliGRAM(s) IV Push every 6 hours PRN Moderate Pain (4 - 6)  morphine  - Injectable 4 milliGRAM(s) IV Push every 6 hours PRN Severe Pain (7 - 10)  ondansetron Injectable 4 milliGRAM(s) IV Push every 6 hours PRN Nausea and/or Vomiting  zolpidem 5 milliGRAM(s) Oral at bedtime PRN Insomnia      Allergies  No Known Allergies        Vital Signs Last 24 Hrs  T(C): 37.1 (29 Nov 2019 09:22), Max: 37.1 (29 Nov 2019 09:22)  T(F): 98.8 (29 Nov 2019 09:22), Max: 98.8 (29 Nov 2019 09:22)  HR: 54 (29 Nov 2019 09:22) (54 - 62)  BP: 143/84 (29 Nov 2019 09:22) (129/67 - 147/75)  BP(mean): --  RR: 18 (29 Nov 2019 09:22) (18 - 18)  SpO2: 95% (28 Nov 2019 20:51) (95% - 97%)    PHYSICAL EXAM:    GENERAL: NAD, well-groomed, well-developed  HEAD:  Atraumatic, Normocephalic  EYES: EOMI, PERRLA, conjunctiva and sclera clear  ENMT: No tonsillar erythema, exudates, or enlargement; Moist mucous membranes, Good dentition, No lesions  NECK: Supple, No JVD, Normal thyroid  CHEST/LUNG: Clear to percussion bilaterally; No rales, rhonchi, wheezing, or rubs  HEART: Regular rate and rhythm; No murmurs, rubs, or gallops  ABDOMEN: Soft, Nontender, Nondistended; Bowel sounds present  EXTREMITIES:  2+ Peripheral Pulses, No clubbing, cyanosis, or edema  LYMPH: No lymphadenopathy noted  SKIN: No rashes or lesions      LABS:                        12.4   8.08  )-----------( 70       ( 28 Nov 2019 07:49 )             38.0     11-28    138  |  104  |  11.0  ----------------------------<  91  3.7   |  21.0<L>  |  0.44<L>    Ca    8.4<L>      28 Nov 2019 07:49    TPro  5.4<L>  /  Alb  2.6<L>  /  TBili  1.0  /  DBili  x   /  AST  35<H>  /  ALT  57<H>  /  AlkPhos  146<H>  11-28    PT/INR - ( 28 Nov 2019 07:49 )   PT: 11.5 sec;   INR: 1.00 ratio      RADIOLOGY & ADDITIONAL STUDIES:

## 2019-11-29 NOTE — PROGRESS NOTE ADULT - REASON FOR ADMISSION
Choledocholithiasis

## 2019-11-29 NOTE — CDI QUERY NOTE - NSCDIOTHERTXTBX_GEN_ALL_CORE_HH
Can you please document your clinical criteria that will support your diagnosis of septicemia, or document if it is ruled out?    A.	Sepsis/septicemia ruled out, patient has Ecoli bacteremia  B.	Other, please specify  C.	Not clinically significant      Supporting documentations:    11/25/19 ED Adult Flow Sheet;  Temperature  Temp (F): 98.9 Degrees F  Temp (C) Temp (C): 37.2 Degrees C  Heart Rate (beats/min): 87 /min  BP Systolic: 125 mm Hg  BP Diastolic (mm Hg): 81 mm Hg  Respiration Rate (breaths/min): 18 /min  SpO2 (%) SpO2 (%): 96 %  O2 delivery Patient On: supplemental O2    11/26/19 0551 ED Adult Flow Sheet:  Temp (F): 97.9 Degrees F  Temp (C) Temp (C): 36.6 Degrees C  Heart Rate (beats/min): 84 /min  BP Systolic: 116 mm Hg  BP Diastolic (mm Hg): 59 mm Hg  Respiration Rate (breaths/min): 18 /min    11/27/19 2145 CPOE Vital Signs Adult:  Heart Rate (beats/min): 133 /min    11/28/19 0427 CPOE VitalSigns Adult;  Temp (F): 97.8 Degrees F  Temp (C): 36.6 Degrees C  Heart Rate (beats/min): 53 /min  BP Systolic: 151 mm Hg  BP Diastolic (mm Hg): 81 mm Hg  Respiration Rate (breaths/min): 18 /min      Laboratory    WBC Count  11/25/16; 6.82  11/26/19: 11.19  11/27/19: 7.36  11/28/19: 8.08    Blood Gas Venous-Lactate  11/25/19: 1.8      11/27/19 @ 1812 Progress Note Adult-ID:  Cholangitis CBD stone   Bacteremia with Ecoli    11/29/19 Discharge Note Provider:    SECONDARY DISCHARGE DIAGNOSES  Diagnosis: Gram negative septicemia

## 2019-11-29 NOTE — DISCHARGE NOTE NURSING/CASE MANAGEMENT/SOCIAL WORK - PATIENT PORTAL LINK FT
You can access the FollowMyHealth Patient Portal offered by [facility] by registering at the following website: [email or web address]. By joining [employer]’s FollowMyHealth portal, you will also be able to view your health information using other applications (apps) compatible with our system.

## 2019-11-29 NOTE — DISCHARGE NOTE PROVIDER - NSDCMRMEDTOKEN_GEN_ALL_CORE_FT
acetaminophen 325 mg oral tablet: 2 tab(s) orally every 6 hours, As needed, Mild Pain (1 - 3)  aluminum hydroxide-magnesium hydroxide 200 mg-200 mg/5 mL oral suspension: 30 milliliter(s) orally every 6 hours, As Needed -Dyspepsia   Ambien 5 mg oral tablet: 1 tab(s) orally once a day (at bedtime), As Needed  atenolol 25 mg oral tablet: 1 tab(s) orally every other day  Cipro 500 mg oral tablet: 1 tab(s) orally 2 times a day   docusate sodium 100 mg oral capsule: 1 cap(s) orally 2 times a day as needed for constipation  Flagyl 500 mg oral tablet: 1 tab(s) orally 3 times a day   Lexapro 20 mg oral tablet: 1 tab(s) orally once a day  OxyCONTIN 20 mg oral tablet, extended release: 1 tab(s) orally every 12 hours  pantoprazole 40 mg oral delayed release tablet: 1 tab(s) orally once a day (before a meal)  saccharomyces boulardii lyo 250 mg oral capsule: 1 cap(s) orally 2 times a day  senna oral tablet: 2 tab(s) orally once a day (at bedtime) as needed for constpation  Synthroid 88 mcg (0.088 mg) oral tablet: 1 tab(s) orally once a day  Xanax 0.25 mg oral tablet: 1 tab(s) orally once a day, As Needed for Anxiety

## 2019-11-29 NOTE — DISCHARGE NOTE NURSING/CASE MANAGEMENT/SOCIAL WORK - NSDCPEWEB_GEN_ALL_CORE
[place] website --- [email or web address]/[facility] for Tobacco Control website --- [email or web address]

## 2019-11-29 NOTE — PHYSICAL THERAPY INITIAL EVALUATION ADULT - ADDITIONAL COMMENTS
pt lives alone in an apartment with 0 steps to enter and no stairs inside. pt has a private care aid who comes in 3 days a week x 6 hours a day. pt owns a RW, but does not use it. pt blind stating she has her apartment set up for her to be able to get around.

## 2019-11-29 NOTE — DISCHARGE NOTE PROVIDER - CARE PROVIDERS DIRECT ADDRESSES
,DirectAddress_Unknown,[email or web address]

## 2019-11-30 LAB
CULTURE RESULTS: SIGNIFICANT CHANGE UP
CULTURE RESULTS: SIGNIFICANT CHANGE UP
ORGANISM # SPEC MICROSCOPIC CNT: SIGNIFICANT CHANGE UP
SPECIMEN SOURCE: SIGNIFICANT CHANGE UP
SPECIMEN SOURCE: SIGNIFICANT CHANGE UP

## 2019-11-30 RX ORDER — METRONIDAZOLE 500 MG
1 TABLET ORAL
Qty: 30 | Refills: 0
Start: 2019-11-30 | End: 2019-12-09

## 2019-11-30 RX ORDER — MOXIFLOXACIN HYDROCHLORIDE TABLETS, 400 MG 400 MG/1
1 TABLET, FILM COATED ORAL
Qty: 20 | Refills: 0
Start: 2019-11-30 | End: 2019-12-09

## 2019-12-01 ENCOUNTER — OUTPATIENT (OUTPATIENT)
Dept: OUTPATIENT SERVICES | Facility: HOSPITAL | Age: 84
LOS: 1 days | End: 2019-12-01
Payer: MEDICARE

## 2019-12-01 DIAGNOSIS — Z90.49 ACQUIRED ABSENCE OF OTHER SPECIFIED PARTS OF DIGESTIVE TRACT: Chronic | ICD-10-CM

## 2019-12-01 DIAGNOSIS — Z93.3 COLOSTOMY STATUS: Chronic | ICD-10-CM

## 2019-12-01 DIAGNOSIS — Z98.49 CATARACT EXTRACTION STATUS, UNSPECIFIED EYE: Chronic | ICD-10-CM

## 2019-12-01 LAB
CULTURE RESULTS: SIGNIFICANT CHANGE UP
CULTURE RESULTS: SIGNIFICANT CHANGE UP
SPECIMEN SOURCE: SIGNIFICANT CHANGE UP
SPECIMEN SOURCE: SIGNIFICANT CHANGE UP

## 2019-12-01 PROCEDURE — G9001: CPT

## 2019-12-19 DIAGNOSIS — Z71.89 OTHER SPECIFIED COUNSELING: ICD-10-CM

## 2019-12-19 PROBLEM — H35.30 UNSPECIFIED MACULAR DEGENERATION: Chronic | Status: ACTIVE | Noted: 2019-11-25

## 2019-12-19 PROBLEM — I71.4 ABDOMINAL AORTIC ANEURYSM, WITHOUT RUPTURE: Chronic | Status: ACTIVE | Noted: 2019-11-25

## 2019-12-19 PROBLEM — I10 ESSENTIAL (PRIMARY) HYPERTENSION: Chronic | Status: ACTIVE | Noted: 2019-11-25

## 2019-12-19 PROBLEM — F41.8 OTHER SPECIFIED ANXIETY DISORDERS: Chronic | Status: ACTIVE | Noted: 2019-11-25

## 2019-12-19 PROBLEM — R19.8 OTHER SPECIFIED SYMPTOMS AND SIGNS INVOLVING THE DIGESTIVE SYSTEM AND ABDOMEN: Chronic | Status: ACTIVE | Noted: 2019-11-25

## 2019-12-19 PROBLEM — E03.9 HYPOTHYROIDISM, UNSPECIFIED: Chronic | Status: ACTIVE | Noted: 2019-11-25

## 2019-12-19 PROBLEM — Z93.3 COLOSTOMY STATUS: Chronic | Status: ACTIVE | Noted: 2019-11-25

## 2019-12-19 PROBLEM — M19.90 UNSPECIFIED OSTEOARTHRITIS, UNSPECIFIED SITE: Chronic | Status: ACTIVE | Noted: 2019-11-25

## 2019-12-19 PROBLEM — M48.00 SPINAL STENOSIS, SITE UNSPECIFIED: Chronic | Status: ACTIVE | Noted: 2019-11-25

## 2019-12-19 PROBLEM — M54.9 DORSALGIA, UNSPECIFIED: Chronic | Status: ACTIVE | Noted: 2019-11-25

## 2019-12-19 PROBLEM — H26.9 UNSPECIFIED CATARACT: Chronic | Status: ACTIVE | Noted: 2019-11-25

## 2021-01-01 ENCOUNTER — OUTPATIENT (OUTPATIENT)
Dept: OUTPATIENT SERVICES | Facility: HOSPITAL | Age: 86
LOS: 1 days | End: 2021-01-01

## 2021-01-01 DIAGNOSIS — Z98.49 CATARACT EXTRACTION STATUS, UNSPECIFIED EYE: Chronic | ICD-10-CM

## 2021-01-01 DIAGNOSIS — Z93.3 COLOSTOMY STATUS: Chronic | ICD-10-CM

## 2021-01-01 DIAGNOSIS — Z90.49 ACQUIRED ABSENCE OF OTHER SPECIFIED PARTS OF DIGESTIVE TRACT: Chronic | ICD-10-CM

## 2021-03-31 NOTE — ED ADULT NURSE NOTE - NS ED PATIENT SAFETY CONCERN
Unable to assess due to medical condition Chest Pain    WHAT YOU NEED TO KNOW:    Chest pain can be caused by a range of conditions, from not serious to life-threatening. Chest pain can be a symptom of a digestive problem, such as acid reflux or a stomach ulcer. An anxiety attack or a strong emotion, such as anger, can also cause chest pain. Infection, inflammation, or a fracture in the bones or cartilage in your chest can cause pain or discomfort. Sometimes chest pain or pressure is caused by poor blood flow to your heart (angina). Chest pain may also be caused by life-threatening conditions such as a heart attack or blood clot in your lungs.    DISCHARGE INSTRUCTIONS:    Call your local emergency number (911 in the [place]) or have someone call if:   •You have any of the following signs of a heart attack: ?Squeezing, pressure, or pain in your chest      ?You may also have any of the following: ?Discomfort or pain in your back, neck, jaw, stomach, or arm      ?Shortness of breath      ?Nausea or vomiting      ?Lightheadedness or a sudden cold sweat            Return to the emergency department if:   •You have chest discomfort that gets worse, even with medicine.      •You cough or vomit blood.      •Your bowel movements are black or bloody.      •You cannot stop vomiting, or it hurts to swallow.      Call your doctor if:   •You have questions or concerns about your condition or care.          Medicines:   •Medicines may be given to treat the cause of your chest pain. Examples include pain medicine, anxiety medicine, or medicines to increase blood flow to your heart.      •Do not take certain medicines without asking your healthcare provider first. These include NSAIDs, herbal or vitamin supplements, or hormones (estrogen or progestin).      •Take your medicine as directed. Contact your healthcare provider if you think your medicine is not helping or if you have side effects. Tell him or her if you are allergic to any medicine. Keep a list of the medicines, vitamins, and herbs you take. Include the amounts, and when and why you take them. Bring the list or the pill bottles to follow-up visits. Carry your medicine list with you in case of an emergency.      Healthy living tips: The following are general healthy guidelines. If the cause of your chest pain is known, your healthcare provider will give you specific guidelines to follow.  •Do not smoke. Nicotine and other chemicals in cigarettes and cigars can cause lung and heart damage. Ask your healthcare provider for information if you currently smoke and need help to quit. E-cigarettes or smokeless tobacco still contain nicotine. Talk to your healthcare provider before you use these products.      •Choose a variety of healthy foods as often as possible. Include fresh, frozen, or canned fruits and vegetables. Also include low-fat dairy products, fish, chicken (without skin), and lean meats. Your healthcare provider or a dietitian can help you create meal plans. You may need to avoid certain foods or drinks if your pain is caused by a digestion problem.  Healthy Foods           •Lower your sodium (salt) intake. Limit foods that are high in sodium, such as canned foods, salty snacks, and cold cuts. If you add salt when you cook food, do not add more at the table. Choose low-sodium canned foods as much as possible.             •Drink plenty of water every day. Water helps your body to control your temperature and blood pressure. Ask your healthcare provider how much water you should drink every day.      •Ask about activity. Your healthcare provider will tell you which activities to limit or avoid. Ask when you can drive, return to work, and have sex. Ask about the best exercise plan for you.      •Maintain a healthy weight. Ask your healthcare provider what a healthy weight is for you. Ask him or her to help you create a safe weight loss plan if you are overweight.      •Ask about vaccines you may need. Get the influenza (flu) vaccine every year as soon as recommended, usually in September or October. You may also need a pneumococcal vaccine to prevent pneumonia. The vaccine is usually given every 5 years, starting at age 65. Your healthcare provider can tell you if should get other vaccines, and when to get them.      Follow up with your healthcare provider within 72 hours, or as directed: You may need to return for more tests to find the cause of your chest pain. You may be referred to a specialist, such as a cardiologist or gastroenterologist. Write down your questions so you remember to ask them during your visits. Please follow up with pcp return to er for any worsening symptoms    Chest Pain  Please follow up with your pcp Dr. Gallo return to er for any worsening symptoms    WHAT YOU NEED TO KNOW:    Chest pain can be caused by a range of conditions, from not serious to life-threatening. Chest pain can be a symptom of a digestive problem, such as acid reflux or a stomach ulcer. An anxiety attack or a strong emotion, such as anger, can also cause chest pain. Infection, inflammation, or a fracture in the bones or cartilage in your chest can cause pain or discomfort. Sometimes chest pain or pressure is caused by poor blood flow to your heart (angina). Chest pain may also be caused by life-threatening conditions such as a heart attack or blood clot in your lungs.    DISCHARGE INSTRUCTIONS:    Call your local emergency number (911 in the [place]) or have someone call if:   •You have any of the following signs of a heart attack: ?Squeezing, pressure, or pain in your chest      ?You may also have any of the following: ?Discomfort or pain in your back, neck, jaw, stomach, or arm      ?Shortness of breath      ?Nausea or vomiting      ?Lightheadedness or a sudden cold sweat            Return to the emergency department if:   •You have chest discomfort that gets worse, even with medicine.      •You cough or vomit blood.      •Your bowel movements are black or bloody.      •You cannot stop vomiting, or it hurts to swallow.      Call your doctor if:   •You have questions or concerns about your condition or care.          Medicines:   •Medicines may be given to treat the cause of your chest pain. Examples include pain medicine, anxiety medicine, or medicines to increase blood flow to your heart.      •Do not take certain medicines without asking your healthcare provider first. These include NSAIDs, herbal or vitamin supplements, or hormones (estrogen or progestin).      •Take your medicine as directed. Contact your healthcare provider if you think your medicine is not helping or if you have side effects. Tell him or her if you are allergic to any medicine. Keep a list of the medicines, vitamins, and herbs you take. Include the amounts, and when and why you take them. Bring the list or the pill bottles to follow-up visits. Carry your medicine list with you in case of an emergency.      Healthy living tips: The following are general healthy guidelines. If the cause of your chest pain is known, your healthcare provider will give you specific guidelines to follow.  •Do not smoke. Nicotine and other chemicals in cigarettes and cigars can cause lung and heart damage. Ask your healthcare provider for information if you currently smoke and need help to quit. E-cigarettes or smokeless tobacco still contain nicotine. Talk to your healthcare provider before you use these products.      •Choose a variety of healthy foods as often as possible. Include fresh, frozen, or canned fruits and vegetables. Also include low-fat dairy products, fish, chicken (without skin), and lean meats. Your healthcare provider or a dietitian can help you create meal plans. You may need to avoid certain foods or drinks if your pain is caused by a digestion problem.  Healthy Foods           •Lower your sodium (salt) intake. Limit foods that are high in sodium, such as canned foods, salty snacks, and cold cuts. If you add salt when you cook food, do not add more at the table. Choose low-sodium canned foods as much as possible.             •Drink plenty of water every day. Water helps your body to control your temperature and blood pressure. Ask your healthcare provider how much water you should drink every day.      •Ask about activity. Your healthcare provider will tell you which activities to limit or avoid. Ask when you can drive, return to work, and have sex. Ask about the best exercise plan for you.      •Maintain a healthy weight. Ask your healthcare provider what a healthy weight is for you. Ask him or her to help you create a safe weight loss plan if you are overweight.      •Ask about vaccines you may need. Get the influenza (flu) vaccine every year as soon as recommended, usually in September or October. You may also need a pneumococcal vaccine to prevent pneumonia. The vaccine is usually given every 5 years, starting at age 65. Your healthcare provider can tell you if should get other vaccines, and when to get them.      Follow up with your healthcare provider within 72 hours, or as directed: You may need to return for more tests to find the cause of your chest pain. You may be referred to a specialist, such as a cardiologist or gastroenterologist. Write down your questions so you remember to ask them during your visits.

## 2022-07-27 NOTE — ED ADULT NURSE NOTE - CHPI ED NUR SYMPTOMS POS
[facility]  1 (660) [facility]-WELL ([phone])  Text "WELL" to 64107  Website: www.[employer]/.[employer] 1 (822) 311-SJTV (9327) Website: www.safehorizon.org/.National Suicide Prevention Lifeline 7 (636) 571-9881/.  [employer]  1 (217) LIFENET (124-0529)/.  [facility]  [address] [place] [address] [phone]   Hours:  Monday through Friday from 9 AM to 3 PM/.  [place] [facility] - [facility] Crisis Line  9 (472) 814-6637, Option 1 DECREASED EATING/DRINKING
